# Patient Record
Sex: FEMALE | Race: WHITE | NOT HISPANIC OR LATINO | Employment: OTHER | URBAN - METROPOLITAN AREA
[De-identification: names, ages, dates, MRNs, and addresses within clinical notes are randomized per-mention and may not be internally consistent; named-entity substitution may affect disease eponyms.]

---

## 2017-03-02 ENCOUNTER — HISTORICAL (OUTPATIENT)
Dept: RADIOLOGY | Facility: HOSPITAL | Age: 62
End: 2017-03-02

## 2017-09-27 ENCOUNTER — HISTORICAL (OUTPATIENT)
Dept: ADMINISTRATIVE | Facility: HOSPITAL | Age: 62
End: 2017-09-27

## 2018-01-26 ENCOUNTER — HISTORICAL (OUTPATIENT)
Dept: ADMINISTRATIVE | Facility: HOSPITAL | Age: 63
End: 2018-01-26

## 2018-10-10 ENCOUNTER — HISTORICAL (OUTPATIENT)
Dept: ADMINISTRATIVE | Facility: HOSPITAL | Age: 63
End: 2018-10-10

## 2018-12-06 LAB
INFLUENZA A ANTIGEN, POC: NEGATIVE
INFLUENZA B ANTIGEN, POC: NEGATIVE

## 2018-12-14 LAB
INFLUENZA A ANTIGEN, POC: NEGATIVE
INFLUENZA B ANTIGEN, POC: NEGATIVE

## 2019-07-24 ENCOUNTER — HISTORICAL (OUTPATIENT)
Dept: LAB | Facility: HOSPITAL | Age: 64
End: 2019-07-24

## 2019-08-02 ENCOUNTER — HISTORICAL (OUTPATIENT)
Dept: ADMINISTRATIVE | Facility: HOSPITAL | Age: 64
End: 2019-08-02

## 2020-06-17 ENCOUNTER — HISTORICAL (OUTPATIENT)
Dept: ADMINISTRATIVE | Facility: HOSPITAL | Age: 65
End: 2020-06-17

## 2020-11-25 ENCOUNTER — HISTORICAL (OUTPATIENT)
Dept: ADMINISTRATIVE | Facility: HOSPITAL | Age: 65
End: 2020-11-25

## 2021-02-10 LAB
CHOLEST SERPL-MSCNC: 125 MG/DL (ref 0–200)
HDLC SERPL-MCNC: 46 MG/DL (ref 35–70)
LDLC SERPL CALC-MCNC: 50 MG/DL (ref 0–160)
TRIGL SERPL-MCNC: 144 MG/DL (ref 40–160)

## 2021-10-26 ENCOUNTER — HISTORICAL (OUTPATIENT)
Dept: ADMINISTRATIVE | Facility: HOSPITAL | Age: 66
End: 2021-10-26

## 2021-10-29 ENCOUNTER — HISTORICAL (OUTPATIENT)
Dept: ADMINISTRATIVE | Facility: HOSPITAL | Age: 66
End: 2021-10-29

## 2021-11-24 ENCOUNTER — HISTORICAL (OUTPATIENT)
Dept: ADMINISTRATIVE | Facility: HOSPITAL | Age: 66
End: 2021-11-24

## 2021-11-30 ENCOUNTER — HISTORICAL (OUTPATIENT)
Dept: ADMINISTRATIVE | Facility: HOSPITAL | Age: 66
End: 2021-11-30

## 2022-04-07 ENCOUNTER — HISTORICAL (OUTPATIENT)
Dept: ADMINISTRATIVE | Facility: HOSPITAL | Age: 67
End: 2022-04-07
Payer: COMMERCIAL

## 2022-04-24 VITALS
OXYGEN SATURATION: 99 % | SYSTOLIC BLOOD PRESSURE: 132 MMHG | WEIGHT: 250 LBS | DIASTOLIC BLOOD PRESSURE: 84 MMHG | BODY MASS INDEX: 40.18 KG/M2 | HEIGHT: 66 IN

## 2022-04-28 NOTE — OP NOTE
Patient:   Lola Arana            MRN: [id number]            FIN: [id number]               Age:   66 years     Sex:  Female     :  1955   Associated Diagnoses:   None   Author:   Tom Barone MD      Operative Note   DATE OF OPERATION:  2021    PREOPERATIVE DIAGNOSIS:  1. Left carpal tunnel syndrome    POSTOPERATIVE DIAGNOSIS:  1. Left carpal tunnel syndrome    SURGEON:  Tom Barone M.D.   ASSISTANT: HILARY Lopez    PROCEDURE:  1. Left carpal tunnel release    ANESTHESIA:  Local with monitored anesthesia care    BLOOD LOSS:  minimal    SPECIMEN(s):  none    COMPLICATIONS:  None    HISTORY:  The patient is a 66-year-old woman with a remote history of right carpal tunnel release who has had progressively worsening left-sided symptoms. Electrodiagnostic testing and physical examination are most consistent with left carpal tunnel syndrome. Options were discussed and, after conservative management failed, surgery was elected. The patient understood and accepted the nature of the surgery as well as its attendant risks.    FINDINGS:  As expected there was thickening of the transverse carpal ligament. There was reactive hyperemia of the nerve after decompression. The nerve was well decompressed proximally and distally at the completion of the procedure    PROCEDURE IN DETAIL:  Under monitored anesthesia care patient's [ ] arm was prepped and draped in the usual fashion. Small linear incision was marked out just to the ulnar side of the palmar crease and infiltrated with local anesthetic containing epinephrine. Incision was carried down through the skin and subcutaneous tissues with a knife and then self-retaining retractor was put into place. He is tissue and then the transverse carpal ligament was divided sharply and then scissors were used to complete the decompression proximally and distally. The wound was irrigated copiously with antibiotic irrigation. Bleeding points were  controlled bipolar cautery. The wound was closed with 3-0 nylon in an interrupted vertical mattress fashion. The wound was dressed locally. The patient was taken to the postanesthetic care unit in satisfactory condition with correct sponge and needle counts.

## 2022-04-28 NOTE — H&P
Patient:   Lola Arana            MRN: [id number]            FIN: [id number]               Age:   66 years     Sex:  Female     :  1955   Associated Diagnoses:   None   Author:   Homer BRAY, Mary Candelaria      Health Status   The H&P was reviewed, the patient was examined, and there are no changes to the patient's condition..

## 2022-05-02 DIAGNOSIS — F41.1 GAD (GENERALIZED ANXIETY DISORDER): Primary | ICD-10-CM

## 2022-05-02 RX ORDER — VENLAFAXINE HYDROCHLORIDE 75 MG/1
75 CAPSULE, EXTENDED RELEASE ORAL DAILY
Qty: 90 CAPSULE | Refills: 3 | Status: CANCELLED | OUTPATIENT
Start: 2022-05-02

## 2022-05-02 RX ORDER — VENLAFAXINE HYDROCHLORIDE 75 MG/1
1 CAPSULE, EXTENDED RELEASE ORAL DAILY
COMMUNITY
Start: 2022-04-25 | End: 2022-06-20 | Stop reason: SDUPTHER

## 2022-05-16 DIAGNOSIS — G56.02 CARPAL TUNNEL SYNDROME ON LEFT: Primary | ICD-10-CM

## 2022-06-01 ENCOUNTER — HOSPITAL ENCOUNTER (OUTPATIENT)
Dept: RADIOLOGY | Facility: CLINIC | Age: 67
Discharge: HOME | End: 2022-06-01
Attending: NURSE PRACTITIONER
Payer: COMMERCIAL

## 2022-06-01 ENCOUNTER — OFFICE VISIT (OUTPATIENT)
Dept: ORTHOPEDICS | Facility: CLINIC | Age: 67
End: 2022-06-01
Payer: COMMERCIAL

## 2022-06-01 VITALS
HEIGHT: 66 IN | BODY MASS INDEX: 40.02 KG/M2 | WEIGHT: 249 LBS | DIASTOLIC BLOOD PRESSURE: 84 MMHG | SYSTOLIC BLOOD PRESSURE: 132 MMHG

## 2022-06-01 DIAGNOSIS — M18.12 ARTHRITIS OF CARPOMETACARPAL (CMC) JOINT OF LEFT THUMB: ICD-10-CM

## 2022-06-01 DIAGNOSIS — G56.02 CARPAL TUNNEL SYNDROME ON LEFT: ICD-10-CM

## 2022-06-01 PROCEDURE — 3008F BODY MASS INDEX DOCD: CPT | Mod: CPTII,,, | Performed by: NURSE PRACTITIONER

## 2022-06-01 PROCEDURE — 73130 XR HAND COMPLETE 3 VIEW LEFT: ICD-10-PCS | Mod: LT,,, | Performed by: NURSE PRACTITIONER

## 2022-06-01 PROCEDURE — 1159F PR MEDICATION LIST DOCUMENTED IN MEDICAL RECORD: ICD-10-PCS | Mod: CPTII,,, | Performed by: NURSE PRACTITIONER

## 2022-06-01 PROCEDURE — 1125F AMNT PAIN NOTED PAIN PRSNT: CPT | Mod: CPTII,,, | Performed by: NURSE PRACTITIONER

## 2022-06-01 PROCEDURE — 99203 PR OFFICE/OUTPT VISIT, NEW, LEVL III, 30-44 MIN: ICD-10-PCS | Mod: ,,, | Performed by: NURSE PRACTITIONER

## 2022-06-01 PROCEDURE — 1125F PR PAIN SEVERITY QUANTIFIED, PAIN PRESENT: ICD-10-PCS | Mod: CPTII,,, | Performed by: NURSE PRACTITIONER

## 2022-06-01 PROCEDURE — 4010F ACE/ARB THERAPY RXD/TAKEN: CPT | Mod: CPTII,,, | Performed by: NURSE PRACTITIONER

## 2022-06-01 PROCEDURE — 3075F PR MOST RECENT SYSTOLIC BLOOD PRESS GE 130-139MM HG: ICD-10-PCS | Mod: CPTII,,, | Performed by: NURSE PRACTITIONER

## 2022-06-01 PROCEDURE — 99203 OFFICE O/P NEW LOW 30 MIN: CPT | Mod: ,,, | Performed by: NURSE PRACTITIONER

## 2022-06-01 PROCEDURE — 4010F PR ACE/ARB THEARPY RXD/TAKEN: ICD-10-PCS | Mod: CPTII,,, | Performed by: NURSE PRACTITIONER

## 2022-06-01 PROCEDURE — 3079F PR MOST RECENT DIASTOLIC BLOOD PRESSURE 80-89 MM HG: ICD-10-PCS | Mod: CPTII,,, | Performed by: NURSE PRACTITIONER

## 2022-06-01 PROCEDURE — 1159F MED LIST DOCD IN RCRD: CPT | Mod: CPTII,,, | Performed by: NURSE PRACTITIONER

## 2022-06-01 PROCEDURE — 3079F DIAST BP 80-89 MM HG: CPT | Mod: CPTII,,, | Performed by: NURSE PRACTITIONER

## 2022-06-01 PROCEDURE — 73130 X-RAY EXAM OF HAND: CPT | Mod: LT,,, | Performed by: NURSE PRACTITIONER

## 2022-06-01 PROCEDURE — 3008F PR BODY MASS INDEX (BMI) DOCUMENTED: ICD-10-PCS | Mod: CPTII,,, | Performed by: NURSE PRACTITIONER

## 2022-06-01 PROCEDURE — 3075F SYST BP GE 130 - 139MM HG: CPT | Mod: CPTII,,, | Performed by: NURSE PRACTITIONER

## 2022-06-01 RX ORDER — DULAGLUTIDE 0.75 MG/.5ML
INJECTION, SOLUTION SUBCUTANEOUS
COMMUNITY
Start: 2022-05-14 | End: 2023-03-16

## 2022-06-01 RX ORDER — LISINOPRIL 20 MG/1
20 TABLET ORAL DAILY
COMMUNITY
Start: 2022-03-10 | End: 2023-03-16 | Stop reason: SDUPTHER

## 2022-06-01 RX ORDER — ATORVASTATIN CALCIUM 20 MG/1
20 TABLET, FILM COATED ORAL DAILY
COMMUNITY
Start: 2022-03-10 | End: 2022-07-27 | Stop reason: SDUPTHER

## 2022-06-01 RX ORDER — METFORMIN HYDROCHLORIDE 1000 MG/1
1000 TABLET ORAL DAILY
COMMUNITY
Start: 2022-05-24 | End: 2023-02-27 | Stop reason: SDUPTHER

## 2022-06-01 RX ORDER — METOPROLOL SUCCINATE 100 MG/1
100 TABLET, EXTENDED RELEASE ORAL DAILY
COMMUNITY
Start: 2022-03-09 | End: 2023-03-02 | Stop reason: SDUPTHER

## 2022-06-01 RX ORDER — MONTELUKAST SODIUM 10 MG/1
10 TABLET ORAL DAILY
COMMUNITY
Start: 2022-04-25 | End: 2022-07-29 | Stop reason: SDUPTHER

## 2022-06-01 NOTE — PROGRESS NOTES
Chief Complaint:   Chief Complaint   Patient presents with    Left Hand - Pain    Pain     Left carpal tunnel Dr. Barone did sx on it 21, states the pain didnt go away       Consulting Physician: Demi Muñoz PA-C    History of present illness:  She is a pleasant 66-year-old who presents with a longstanding history of left thumb pain.  She had a history of carpal tunnel syndrome, CTR in 2021. Most of her symptoms have resolved with the exception of his thumb pain.  It is worse with any activity, and is affecting her ADLs and her ability to write.  He is somewhat better with rest and a brace.  She has tried occupational therapy without much relief.  Referred by Dr. Barone's office for further evaluation.    Past Medical History:   Diagnosis Date    Arthritis     Diabetes mellitus, type 2     Fractures     Hypertension     Respiratory distress        Past Surgical History:   Procedure Laterality Date    CARPAL TUNNEL RELEASE       SECTION      FOOT SURGERY      GALLBLADDER SURGERY      HERNIA REPAIR      KNEE ARTHROSCOPY      KNEE SURGERY         Current Outpatient Medications   Medication Sig    atorvastatin (LIPITOR) 20 MG tablet Take 20 mg by mouth once daily.    lisinopriL (PRINIVIL,ZESTRIL) 20 MG tablet Take 20 mg by mouth once daily.    metFORMIN (GLUCOPHAGE) 1000 MG tablet Take 1,000 mg by mouth once daily.    metoprolol succinate (TOPROL-XL) 100 MG 24 hr tablet Take 100 mg by mouth once daily.    montelukast (SINGULAIR) 10 mg tablet Take 10 mg by mouth once daily.    TRULICITY 0.75 mg/0.5 mL pen injector     venlafaxine (EFFEXOR-XR) 75 MG 24 hr capsule Take 1 capsule by mouth once daily.     No current facility-administered medications for this visit.       Review of patient's allergies indicates:  No Known Allergies    History reviewed. No pertinent family history.    Social History     Socioeconomic History    Marital status:    Tobacco Use     "Smoking status: Never Smoker    Smokeless tobacco: Never Used       Review of Systems:    Constitution:   Denies chills, fever, and sweats.  HENT:   Denies headaches or blurry vision.  Cardiovascular:  Denies chest pain or irregular heart beat.  Respiratory:   Denies cough or shortness of breath.  Gastrointestinal:  Denies abdominal pain, nausea, or vomiting.  Musculoskeletal:   Denies muscle cramps.  Neurological:   Denies dizziness or focal weakness.  Psychiatric/Behavior: Normal mental status.  Hematology/Lymph:  Denies bleeding problem or easy bruising/bleeding.  Skin:    Denies rash or suspicious lesions.    Examination:    Vital Signs:    Vitals:    06/01/22 1335   BP: 132/84   Weight: 112.9 kg (249 lb)   Height: 5' 6" (1.676 m)   PainSc:   7       Body mass index is 40.19 kg/m².    Constitution:   Well-developed, well nourished patient in no acute distress.  Neurological:   Alert and oriented x 3 and cooperative to examination.     Psychiatric/Behavior: Normal mental status.  Respiratory:   No shortness of breath.  Eyes:    Extraoccular muscles intact  Skin:    No scars, rash or suspicious lesions.    MSK:   Left thumb without obvious deformity. ROM full. Distally neurovascular intact. TTP along the krause side of the thumb. Negative grind test.     Imaging: X-rays ordered and images interpreted today personally by me of 4 views of her left hand/thumb show arthritis of the CMC joint        Assessment: Carpal tunnel syndrome on left  -     Ambulatory referral/consult to Orthopedics  -     X-Ray Hand Complete Left; Future; Expected date: 06/01/2022        Plan:  Treatment options were discussed. She will continue the brace and antiinflammatories. If pain persists or worsens we can consider a CMC arthroplasty.   "

## 2022-06-16 ENCOUNTER — PATIENT MESSAGE (OUTPATIENT)
Dept: PRIMARY CARE CLINIC | Facility: CLINIC | Age: 67
End: 2022-06-16
Payer: COMMERCIAL

## 2022-06-18 PROBLEM — M50.20 HERNIATION OF INTERVERTEBRAL DISC OF CERVICAL REGION: Status: ACTIVE | Noted: 2022-06-18

## 2022-06-18 PROBLEM — G56.00 CARPAL TUNNEL SYNDROME: Status: ACTIVE | Noted: 2022-06-18

## 2022-06-18 PROBLEM — M19.90 OSTEOARTHRITIS: Chronic | Status: ACTIVE | Noted: 2022-06-18

## 2022-06-18 PROBLEM — I10 HYPERTENSION: Chronic | Status: ACTIVE | Noted: 2022-06-18

## 2022-06-18 PROBLEM — F41.1 GENERALIZED ANXIETY DISORDER: Chronic | Status: ACTIVE | Noted: 2022-06-18

## 2022-06-18 PROBLEM — R00.2 PALPITATIONS: Status: ACTIVE | Noted: 2022-06-18

## 2022-06-18 PROBLEM — I10 HYPERTENSION: Status: ACTIVE | Noted: 2022-06-18

## 2022-06-18 PROBLEM — M19.90 OSTEOARTHRITIS: Status: ACTIVE | Noted: 2022-06-18

## 2022-06-18 PROBLEM — R42 VERTIGO: Chronic | Status: ACTIVE | Noted: 2022-06-18

## 2022-06-18 PROBLEM — F41.1 GENERALIZED ANXIETY DISORDER: Status: ACTIVE | Noted: 2022-06-18

## 2022-06-18 PROBLEM — E11.9 DIABETES MELLITUS: Chronic | Status: ACTIVE | Noted: 2022-06-18

## 2022-06-18 PROBLEM — M50.20 HERNIATION OF INTERVERTEBRAL DISC OF CERVICAL REGION: Chronic | Status: ACTIVE | Noted: 2022-06-18

## 2022-06-18 PROBLEM — E78.5 HYPERLIPIDEMIA: Status: ACTIVE | Noted: 2022-06-18

## 2022-06-18 PROBLEM — E78.5 HYPERLIPIDEMIA: Chronic | Status: ACTIVE | Noted: 2022-06-18

## 2022-06-18 PROBLEM — E11.9 DIABETES MELLITUS: Status: ACTIVE | Noted: 2022-06-18

## 2022-06-18 PROBLEM — G56.00 CARPAL TUNNEL SYNDROME: Chronic | Status: ACTIVE | Noted: 2022-06-18

## 2022-06-18 PROBLEM — R00.2 PALPITATIONS: Chronic | Status: ACTIVE | Noted: 2022-06-18

## 2022-06-18 PROBLEM — R42 VERTIGO: Status: ACTIVE | Noted: 2022-06-18

## 2022-06-20 ENCOUNTER — PATIENT MESSAGE (OUTPATIENT)
Dept: PRIMARY CARE CLINIC | Facility: CLINIC | Age: 67
End: 2022-06-20
Payer: COMMERCIAL

## 2022-06-20 RX ORDER — VENLAFAXINE HYDROCHLORIDE 75 MG/1
75 CAPSULE, EXTENDED RELEASE ORAL DAILY
Qty: 30 CAPSULE | Refills: 11 | Status: SHIPPED | OUTPATIENT
Start: 2022-06-20 | End: 2022-06-23 | Stop reason: ALTCHOICE

## 2022-06-23 RX ORDER — VENLAFAXINE HYDROCHLORIDE 150 MG/1
150 CAPSULE, EXTENDED RELEASE ORAL DAILY
Qty: 90 CAPSULE | Refills: 3 | Status: SHIPPED | OUTPATIENT
Start: 2022-06-23 | End: 2022-06-25 | Stop reason: SDUPTHER

## 2022-06-23 RX ORDER — VENLAFAXINE HYDROCHLORIDE 150 MG/1
150 CAPSULE, EXTENDED RELEASE ORAL DAILY
COMMUNITY
End: 2022-06-23 | Stop reason: SDUPTHER

## 2022-07-06 ENCOUNTER — PATIENT OUTREACH (OUTPATIENT)
Dept: ADMINISTRATIVE | Facility: HOSPITAL | Age: 67
End: 2022-07-06
Payer: COMMERCIAL

## 2022-07-06 NOTE — PROGRESS NOTES
Population Health. Out Reach.  The following record(s)  below were uploaded for Health Maintenance .    2/10/21 LIPID PANEL

## 2022-07-20 ENCOUNTER — LAB REQUISITION (OUTPATIENT)
Dept: LAB | Facility: HOSPITAL | Age: 67
End: 2022-07-20
Payer: COMMERCIAL

## 2022-07-20 DIAGNOSIS — Z13.6 ENCOUNTER FOR SCREENING FOR CARDIOVASCULAR DISORDERS: ICD-10-CM

## 2022-07-20 DIAGNOSIS — E11.9 TYPE 2 DIABETES MELLITUS WITHOUT COMPLICATIONS: ICD-10-CM

## 2022-07-20 DIAGNOSIS — E78.5 HYPERLIPIDEMIA, UNSPECIFIED: ICD-10-CM

## 2022-07-20 DIAGNOSIS — Z00.00 ENCOUNTER FOR GENERAL ADULT MEDICAL EXAMINATION WITHOUT ABNORMAL FINDINGS: ICD-10-CM

## 2022-07-20 DIAGNOSIS — I10 ESSENTIAL (PRIMARY) HYPERTENSION: ICD-10-CM

## 2022-07-20 LAB
ALBUMIN SERPL-MCNC: 4 GM/DL (ref 3.4–4.8)
ALBUMIN/GLOB SERPL: 1.6 RATIO (ref 1.1–2)
ALP SERPL-CCNC: 83 UNIT/L (ref 40–150)
ALT SERPL-CCNC: 42 UNIT/L (ref 0–55)
AST SERPL-CCNC: 26 UNIT/L (ref 5–34)
BASOPHILS # BLD AUTO: 0.04 X10(3)/MCL (ref 0–0.2)
BASOPHILS NFR BLD AUTO: 0.6 %
BILIRUBIN DIRECT+TOT PNL SERPL-MCNC: 0.6 MG/DL
BUN SERPL-MCNC: 14.5 MG/DL (ref 9.8–20.1)
CALCIUM SERPL-MCNC: 9.2 MG/DL (ref 8.4–10.2)
CHLORIDE SERPL-SCNC: 109 MMOL/L (ref 98–107)
CHOLEST SERPL-MCNC: 208 MG/DL
CHOLEST/HDLC SERPL: 4 {RATIO} (ref 0–5)
CO2 SERPL-SCNC: 22 MMOL/L (ref 23–31)
CREAT SERPL-MCNC: 0.81 MG/DL (ref 0.55–1.02)
CREAT UR-MCNC: 62.7 MG/DL (ref 47–110)
EOSINOPHIL # BLD AUTO: 0.14 X10(3)/MCL (ref 0–0.9)
EOSINOPHIL NFR BLD AUTO: 2.2 %
ERYTHROCYTE [DISTWIDTH] IN BLOOD BY AUTOMATED COUNT: 15.1 % (ref 11.5–17)
EST. AVERAGE GLUCOSE BLD GHB EST-MCNC: 194.4 MG/DL
GLOBULIN SER-MCNC: 2.5 GM/DL (ref 2.4–3.5)
GLUCOSE SERPL-MCNC: 250 MG/DL (ref 82–115)
HBA1C MFR BLD: 8.4 %
HCT VFR BLD AUTO: 41.8 % (ref 37–47)
HDLC SERPL-MCNC: 57 MG/DL (ref 35–60)
HGB BLD-MCNC: 13.7 GM/DL (ref 12–16)
IMM GRANULOCYTES # BLD AUTO: 0.02 X10(3)/MCL (ref 0–0.04)
IMM GRANULOCYTES NFR BLD AUTO: 0.3 %
LDLC SERPL CALC-MCNC: 120 MG/DL (ref 50–140)
LYMPHOCYTES # BLD AUTO: 1.83 X10(3)/MCL (ref 0.6–4.6)
LYMPHOCYTES NFR BLD AUTO: 28.7 %
MCH RBC QN AUTO: 30.1 PG (ref 27–31)
MCHC RBC AUTO-ENTMCNC: 32.8 MG/DL (ref 33–36)
MCV RBC AUTO: 91.9 FL (ref 80–94)
MICROALBUMIN UR-MCNC: <5 UG/ML
MICROALBUMIN/CREAT RATIO PNL UR: <8 MG/GM CR (ref 0–30)
MONOCYTES # BLD AUTO: 0.51 X10(3)/MCL (ref 0.1–1.3)
MONOCYTES NFR BLD AUTO: 8 %
NEUTROPHILS # BLD AUTO: 3.8 X10(3)/MCL (ref 2.1–9.2)
NEUTROPHILS NFR BLD AUTO: 60.2 %
NRBC BLD AUTO-RTO: 0 %
PLATELET # BLD AUTO: 219 X10(3)/MCL (ref 130–400)
PMV BLD AUTO: 11.2 FL (ref 7.4–10.4)
POTASSIUM SERPL-SCNC: 4.3 MMOL/L (ref 3.5–5.1)
PROT SERPL-MCNC: 6.5 GM/DL (ref 5.8–7.6)
RBC # BLD AUTO: 4.55 X10(6)/MCL (ref 4.2–5.4)
SODIUM SERPL-SCNC: 142 MMOL/L (ref 136–145)
TRIGL SERPL-MCNC: 156 MG/DL (ref 37–140)
TSH SERPL-ACNC: 0.91 UIU/ML (ref 0.35–4.94)
VLDLC SERPL CALC-MCNC: 31 MG/DL
WBC # SPEC AUTO: 6.4 X10(3)/MCL (ref 4.5–11.5)

## 2022-07-20 PROCEDURE — 83036 HEMOGLOBIN GLYCOSYLATED A1C: CPT | Performed by: GENERAL PRACTICE

## 2022-07-20 PROCEDURE — 84443 ASSAY THYROID STIM HORMONE: CPT | Performed by: GENERAL PRACTICE

## 2022-07-20 PROCEDURE — 85025 COMPLETE CBC W/AUTO DIFF WBC: CPT | Performed by: GENERAL PRACTICE

## 2022-07-20 PROCEDURE — 80053 COMPREHEN METABOLIC PANEL: CPT | Performed by: GENERAL PRACTICE

## 2022-07-20 PROCEDURE — 82043 UR ALBUMIN QUANTITATIVE: CPT | Performed by: GENERAL PRACTICE

## 2022-07-20 PROCEDURE — 80061 LIPID PANEL: CPT | Performed by: GENERAL PRACTICE

## 2022-07-27 ENCOUNTER — TELEPHONE (OUTPATIENT)
Dept: PRIMARY CARE CLINIC | Facility: CLINIC | Age: 67
End: 2022-07-27
Payer: COMMERCIAL

## 2022-07-27 DIAGNOSIS — E78.5 HYPERLIPIDEMIA, UNSPECIFIED HYPERLIPIDEMIA TYPE: Primary | ICD-10-CM

## 2022-07-27 RX ORDER — ATORVASTATIN CALCIUM 20 MG/1
20 TABLET, FILM COATED ORAL DAILY
Qty: 90 TABLET | Refills: 3 | Status: SHIPPED | OUTPATIENT
Start: 2022-07-27 | End: 2023-06-22 | Stop reason: SDUPTHER

## 2022-07-27 NOTE — TELEPHONE ENCOUNTER
----- Message from Eder Combs MD sent at 7/24/2022 12:50 PM CDT -----  Patient does have an upcoming appointment in about a, but her A1c is a little too high at 8.4%.  I think that her Trulicity dose is 0.75mg per week, if that is the case I would like for her to increase to 1.5 mg per week, if she is agreeable to that and everything is correct .

## 2022-07-28 ENCOUNTER — TELEPHONE (OUTPATIENT)
Dept: PRIMARY CARE CLINIC | Facility: CLINIC | Age: 67
End: 2022-07-28
Payer: COMMERCIAL

## 2022-07-28 DIAGNOSIS — F41.1 GENERALIZED ANXIETY DISORDER: Primary | Chronic | ICD-10-CM

## 2022-07-28 RX ORDER — LORAZEPAM 0.5 MG/1
0.5 TABLET ORAL EVERY 6 HOURS PRN
Qty: 30 TABLET | Refills: 2 | Status: SHIPPED | OUTPATIENT
Start: 2022-07-28 | End: 2023-05-25 | Stop reason: SDUPTHER

## 2022-07-28 NOTE — TELEPHONE ENCOUNTER
Spoke to patient yesterday regarding her A1C and Trulicity medication. She stated that she hasn't been taking the Trulicity since June. She also stated she has not been watching what she has been eating. Please Advise what she needs to do now regarding medication

## 2022-07-29 DIAGNOSIS — J30.2 SEASONAL ALLERGIES: Primary | ICD-10-CM

## 2022-07-29 RX ORDER — MONTELUKAST SODIUM 10 MG/1
10 TABLET ORAL DAILY
Qty: 90 TABLET | Refills: 3 | Status: SHIPPED | OUTPATIENT
Start: 2022-07-29 | End: 2024-04-01 | Stop reason: SDUPTHER

## 2022-08-18 ENCOUNTER — OFFICE VISIT (OUTPATIENT)
Dept: URGENT CARE | Facility: CLINIC | Age: 67
End: 2022-08-18
Payer: COMMERCIAL

## 2022-08-18 VITALS
BODY MASS INDEX: 40.02 KG/M2 | WEIGHT: 249 LBS | HEIGHT: 66 IN | TEMPERATURE: 99 F | OXYGEN SATURATION: 97 % | DIASTOLIC BLOOD PRESSURE: 84 MMHG | SYSTOLIC BLOOD PRESSURE: 139 MMHG | HEART RATE: 84 BPM

## 2022-08-18 DIAGNOSIS — S91.301A OPEN WOUND OF RIGHT FOOT, INITIAL ENCOUNTER: ICD-10-CM

## 2022-08-18 DIAGNOSIS — S91.302A OPEN WOUND OF LEFT FOOT, INITIAL ENCOUNTER: Primary | ICD-10-CM

## 2022-08-18 PROCEDURE — 3061F NEG MICROALBUMINURIA REV: CPT | Mod: CPTII,,, | Performed by: FAMILY MEDICINE

## 2022-08-18 PROCEDURE — 4010F ACE/ARB THERAPY RXD/TAKEN: CPT | Mod: CPTII,,, | Performed by: FAMILY MEDICINE

## 2022-08-18 PROCEDURE — 3075F SYST BP GE 130 - 139MM HG: CPT | Mod: CPTII,,, | Performed by: FAMILY MEDICINE

## 2022-08-18 PROCEDURE — 1159F PR MEDICATION LIST DOCUMENTED IN MEDICAL RECORD: ICD-10-PCS | Mod: CPTII,,, | Performed by: FAMILY MEDICINE

## 2022-08-18 PROCEDURE — 99213 PR OFFICE/OUTPT VISIT, EST, LEVL III, 20-29 MIN: ICD-10-PCS | Mod: ,,, | Performed by: FAMILY MEDICINE

## 2022-08-18 PROCEDURE — 3008F PR BODY MASS INDEX (BMI) DOCUMENTED: ICD-10-PCS | Mod: CPTII,,, | Performed by: FAMILY MEDICINE

## 2022-08-18 PROCEDURE — 1159F MED LIST DOCD IN RCRD: CPT | Mod: CPTII,,, | Performed by: FAMILY MEDICINE

## 2022-08-18 PROCEDURE — 3061F PR NEG MICROALBUMINURIA RESULT DOCUMENTED/REVIEW: ICD-10-PCS | Mod: CPTII,,, | Performed by: FAMILY MEDICINE

## 2022-08-18 PROCEDURE — 3079F PR MOST RECENT DIASTOLIC BLOOD PRESSURE 80-89 MM HG: ICD-10-PCS | Mod: CPTII,,, | Performed by: FAMILY MEDICINE

## 2022-08-18 PROCEDURE — 3066F PR DOCUMENTATION OF TREATMENT FOR NEPHROPATHY: ICD-10-PCS | Mod: CPTII,,, | Performed by: FAMILY MEDICINE

## 2022-08-18 PROCEDURE — 1160F RVW MEDS BY RX/DR IN RCRD: CPT | Mod: CPTII,,, | Performed by: FAMILY MEDICINE

## 2022-08-18 PROCEDURE — 3066F NEPHROPATHY DOC TX: CPT | Mod: CPTII,,, | Performed by: FAMILY MEDICINE

## 2022-08-18 PROCEDURE — 99213 OFFICE O/P EST LOW 20 MIN: CPT | Mod: ,,, | Performed by: FAMILY MEDICINE

## 2022-08-18 PROCEDURE — 1160F PR REVIEW ALL MEDS BY PRESCRIBER/CLIN PHARMACIST DOCUMENTED: ICD-10-PCS | Mod: CPTII,,, | Performed by: FAMILY MEDICINE

## 2022-08-18 PROCEDURE — 4010F PR ACE/ARB THEARPY RXD/TAKEN: ICD-10-PCS | Mod: CPTII,,, | Performed by: FAMILY MEDICINE

## 2022-08-18 PROCEDURE — 3079F DIAST BP 80-89 MM HG: CPT | Mod: CPTII,,, | Performed by: FAMILY MEDICINE

## 2022-08-18 PROCEDURE — 3008F BODY MASS INDEX DOCD: CPT | Mod: CPTII,,, | Performed by: FAMILY MEDICINE

## 2022-08-18 PROCEDURE — 3075F PR MOST RECENT SYSTOLIC BLOOD PRESS GE 130-139MM HG: ICD-10-PCS | Mod: CPTII,,, | Performed by: FAMILY MEDICINE

## 2022-08-18 RX ORDER — CLINDAMYCIN HYDROCHLORIDE 300 MG/1
300 CAPSULE ORAL EVERY 6 HOURS
Qty: 28 CAPSULE | Refills: 0 | Status: SHIPPED | OUTPATIENT
Start: 2022-08-18 | End: 2022-08-25

## 2022-08-18 NOTE — PROGRESS NOTES
"Subjective:       Patient ID: Lola Arana is a 67 y.o. female.    Vitals:  height is 5' 6" (1.676 m) and weight is 112.9 kg (249 lb). Her temperature is 98.8 °F (37.1 °C). Her blood pressure is 139/84 and her pulse is 84. Her oxygen saturation is 97%.     Chief Complaint: Toe Injury (67 y.o. female presents to clinic for possible infection from wound underneath  great toe on left foot since 6 days. )    67 y.o. female presents to clinic for possible infection from wound underneath  great toe on left foot since 6 days.  States there is an open wound on the left great toe and on the 2nd toe of the right foot.  States this just occurred she was wearing for care that did not provide proper protection.  Patient states last time she had this she developed bad MRSA infection.  Patient is diabetic.  Denies any fever.    Toe Injury  This is a new problem. The current episode started in the past 7 days.       Constitution: Negative.   HENT: Negative.    Cardiovascular: Negative.    Eyes: Negative.    Respiratory: Negative.    Gastrointestinal: Negative.    Genitourinary: Negative.    Musculoskeletal: Negative.    Skin: Negative.  Positive for erythema (There are 2 open wounds.  Left great toe approximately the size of a nickel and the 2nd toe of the right foot around the size of a dime.  There is no active drainage.  There is no active bleeding.  no significant erythema).   Allergic/Immunologic: Negative.    Neurological: Negative.    Hematologic/Lymphatic: Negative.        Objective:      Physical Exam   HENT:   Head: Normocephalic.   Eyes: Conjunctivae are normal.   Abdominal: Normal appearance.   Neurological: She is alert.   Skin: erythema (There are 2 open wounds.  Left great toe approximately the size of a nickel and the 2nd toe of the right foot around the size of a dime.  There is no active drainage.  There is no active bleeding.  no significant erythema)   Vitals reviewed.         Previous History      Review of " "patient's allergies indicates:  No Known Allergies    Past Medical History:   Diagnosis Date    Arthritis     Diabetes mellitus, type 2     Fractures     Hypertension     Respiratory distress      Current Outpatient Medications   Medication Instructions    atorvastatin (LIPITOR) 20 mg, Oral, Daily    clindamycin (CLEOCIN) 300 mg, Oral, Every 6 hours    lisinopriL (PRINIVIL,ZESTRIL) 20 mg, Oral, Daily    LORazepam (ATIVAN) 0.5 mg, Oral, Every 6 hours PRN    metFORMIN (GLUCOPHAGE) 1,000 mg, Oral, Daily    metoprolol succinate (TOPROL-XL) 100 mg, Oral, Daily    montelukast (SINGULAIR) 10 mg, Oral, Daily    TRULICITY 0.75 mg/0.5 mL pen injector No dose, route, or frequency recorded.    venlafaxine (EFFEXOR-XR) 150 mg, Oral, Daily     Past Surgical History:   Procedure Laterality Date    CARPAL TUNNEL RELEASE       SECTION      FOOT SURGERY      GALLBLADDER SURGERY      HERNIA REPAIR      KNEE ARTHROSCOPY      KNEE SURGERY       History reviewed. No pertinent family history.    Social History     Tobacco Use    Smoking status: Never Smoker    Smokeless tobacco: Never Used        Physical Exam      Vital Signs Reviewed   /84   Pulse 84   Temp 98.8 °F (37.1 °C)   Ht 5' 6" (1.676 m)   Wt 112.9 kg (249 lb)   SpO2 97%   BMI 40.19 kg/m²        Procedures    Procedures     Labs     Results for orders placed or performed in visit on 22   Hemoglobin A1C   Result Value Ref Range    Hemoglobin A1c 8.4 (H) <=7.0 %    Estimated Average Glucose 194.4 mg/dL   Comprehensive Metabolic Panel   Result Value Ref Range    Sodium Level 142 136 - 145 mmol/L    Potassium Level 4.3 3.5 - 5.1 mmol/L    Chloride 109 (H) 98 - 107 mmol/L    Carbon Dioxide 22 (L) 23 - 31 mmol/L    Glucose Level 250 (H) 82 - 115 mg/dL    Blood Urea Nitrogen 14.5 9.8 - 20.1 mg/dL    Creatinine 0.81 0.55 - 1.02 mg/dL    Calcium Level Total 9.2 8.4 - 10.2 mg/dL    Protein Total 6.5 5.8 - 7.6 gm/dL    Albumin Level 4.0 3.4 " - 4.8 gm/dL    Globulin 2.5 2.4 - 3.5 gm/dL    Albumin/Globulin Ratio 1.6 1.1 - 2.0 ratio    Bilirubin Total 0.6 <=1.5 mg/dL    Alkaline Phosphatase 83 40 - 150 unit/L    Alanine Aminotransferase 42 0 - 55 unit/L    Aspartate Aminotransferase 26 5 - 34 unit/L    Estimated GFR-Non  >60 mls/min/1.73/m2   TSH   Result Value Ref Range    Thyroid Stimulating Hormone 0.9110 0.3500 - 4.9400 uIU/mL   Lipid Panel   Result Value Ref Range    Cholesterol Total 208 (H) <=200 mg/dL    HDL Cholesterol 57 35 - 60 mg/dL    Triglyceride 156 (H) 37 - 140 mg/dL    Cholesterol/HDL Ratio 4 0 - 5    Very Low Density Lipoprotein 31     LDL Cholesterol 120.00 50.00 - 140.00 mg/dL   CBC with Differential   Result Value Ref Range    WBC 6.4 4.5 - 11.5 x10(3)/mcL    RBC 4.55 4.20 - 5.40 x10(6)/mcL    Hgb 13.7 12.0 - 16.0 gm/dL    Hct 41.8 37.0 - 47.0 %    MCV 91.9 80.0 - 94.0 fL    MCH 30.1 27.0 - 31.0 pg    MCHC 32.8 (L) 33.0 - 36.0 mg/dL    RDW 15.1 11.5 - 17.0 %    Platelet 219 130 - 400 x10(3)/mcL    MPV 11.2 (H) 7.4 - 10.4 fL    Neut % 60.2 %    Lymph % 28.7 %    Mono % 8.0 %    Eos % 2.2 %    Basophil % 0.6 %    Lymph # 1.83 0.6 - 4.6 x10(3)/mcL    Neut # 3.8 2.1 - 9.2 x10(3)/mcL    Mono # 0.51 0.1 - 1.3 x10(3)/mcL    Eos # 0.14 0 - 0.9 x10(3)/mcL    Baso # 0.04 0 - 0.2 x10(3)/mcL    IG# 0.02 0 - 0.04 x10(3)/mcL    IG% 0.3 %    NRBC% 0.0 %           Assessment:       1. Open wound of left foot, initial encounter    2. Open wound of right foot, initial encounter          Plan:       Antibiotics sent to pharmacy.  You have been referred back to the podiatrist you have seen previously  Keep the dressings clean and dry.  You have extra Xeroform gauze to apply if dressings become soiled.  Monitor for fever.  Monitor for signs of worsening infection.  This would include increasing pain increasing redness pus drainage from the wounds or fever.  Seek medical attention immediately if any of these happen  Open wound of left foot,  initial encounter  -     Ambulatory referral/consult to Podiatry    Open wound of right foot, initial encounter  -     Ambulatory referral/consult to Podiatry    Other orders  -     clindamycin (CLEOCIN) 300 MG capsule; Take 1 capsule (300 mg total) by mouth every 6 (six) hours. for 7 days  Dispense: 28 capsule; Refill: 0

## 2022-08-18 NOTE — PATIENT INSTRUCTIONS
Antibiotics sent to pharmacy.  You have been referred back to the podiatrist you have seen previously  Keep the dressings clean and dry.  You have extra Xeroform gauze to apply if dressings become soiled.  Monitor for fever.  Monitor for signs of worsening infection.  This would include increasing pain increasing redness pus drainage from the wounds or fever.  Seek medical attention immediately if any of these happen

## 2022-08-19 ENCOUNTER — TELEPHONE (OUTPATIENT)
Dept: URGENT CARE | Facility: CLINIC | Age: 67
End: 2022-08-19
Payer: COMMERCIAL

## 2022-08-19 DIAGNOSIS — S91.302A OPEN WOUND OF LEFT FOOT, INITIAL ENCOUNTER: Primary | ICD-10-CM

## 2022-08-19 NOTE — TELEPHONE ENCOUNTER
Referral Sent.           Patient would like a referral to wound care because podiatry will not be able to see her in a timely manner.     Please advise.

## 2022-08-19 NOTE — TELEPHONE ENCOUNTER
Pt was seen in clinic 8/18/22 and was referred to a pediatrist .An appointment would not be available x 3 weeks and pt now requests referral to wound care. Please advise.

## 2022-08-30 ENCOUNTER — OFFICE VISIT (OUTPATIENT)
Dept: PRIMARY CARE CLINIC | Facility: CLINIC | Age: 67
End: 2022-08-30
Payer: COMMERCIAL

## 2022-08-30 VITALS
BODY MASS INDEX: 40.18 KG/M2 | DIASTOLIC BLOOD PRESSURE: 80 MMHG | HEART RATE: 96 BPM | RESPIRATION RATE: 18 BRPM | SYSTOLIC BLOOD PRESSURE: 123 MMHG | HEIGHT: 66 IN | WEIGHT: 250 LBS | OXYGEN SATURATION: 97 %

## 2022-08-30 DIAGNOSIS — I10 PRIMARY HYPERTENSION: Chronic | ICD-10-CM

## 2022-08-30 DIAGNOSIS — F41.1 GENERALIZED ANXIETY DISORDER: Chronic | ICD-10-CM

## 2022-08-30 DIAGNOSIS — E78.5 DYSLIPIDEMIA: Chronic | ICD-10-CM

## 2022-08-30 DIAGNOSIS — E11.9 TYPE 2 DIABETES MELLITUS WITHOUT COMPLICATION, WITHOUT LONG-TERM CURRENT USE OF INSULIN: Chronic | ICD-10-CM

## 2022-08-30 DIAGNOSIS — Z00.00 WELLNESS EXAMINATION: Primary | ICD-10-CM

## 2022-08-30 PROCEDURE — 3061F PR NEG MICROALBUMINURIA RESULT DOCUMENTED/REVIEW: ICD-10-PCS | Mod: CPTII,,, | Performed by: GENERAL PRACTICE

## 2022-08-30 PROCEDURE — 3008F BODY MASS INDEX DOCD: CPT | Mod: CPTII,,, | Performed by: GENERAL PRACTICE

## 2022-08-30 PROCEDURE — G0439 PR MEDICARE ANNUAL WELLNESS SUBSEQUENT VISIT: ICD-10-PCS | Mod: ,,, | Performed by: GENERAL PRACTICE

## 2022-08-30 PROCEDURE — 1159F PR MEDICATION LIST DOCUMENTED IN MEDICAL RECORD: ICD-10-PCS | Mod: CPTII,,, | Performed by: GENERAL PRACTICE

## 2022-08-30 PROCEDURE — 1159F MED LIST DOCD IN RCRD: CPT | Mod: CPTII,,, | Performed by: GENERAL PRACTICE

## 2022-08-30 PROCEDURE — 3066F PR DOCUMENTATION OF TREATMENT FOR NEPHROPATHY: ICD-10-PCS | Mod: CPTII,,, | Performed by: GENERAL PRACTICE

## 2022-08-30 PROCEDURE — 4010F ACE/ARB THERAPY RXD/TAKEN: CPT | Mod: CPTII,,, | Performed by: GENERAL PRACTICE

## 2022-08-30 PROCEDURE — 4010F PR ACE/ARB THEARPY RXD/TAKEN: ICD-10-PCS | Mod: CPTII,,, | Performed by: GENERAL PRACTICE

## 2022-08-30 PROCEDURE — 3061F NEG MICROALBUMINURIA REV: CPT | Mod: CPTII,,, | Performed by: GENERAL PRACTICE

## 2022-08-30 PROCEDURE — 1160F PR REVIEW ALL MEDS BY PRESCRIBER/CLIN PHARMACIST DOCUMENTED: ICD-10-PCS | Mod: CPTII,,, | Performed by: GENERAL PRACTICE

## 2022-08-30 PROCEDURE — 3079F PR MOST RECENT DIASTOLIC BLOOD PRESSURE 80-89 MM HG: ICD-10-PCS | Mod: CPTII,,, | Performed by: GENERAL PRACTICE

## 2022-08-30 PROCEDURE — 3079F DIAST BP 80-89 MM HG: CPT | Mod: CPTII,,, | Performed by: GENERAL PRACTICE

## 2022-08-30 PROCEDURE — 3074F PR MOST RECENT SYSTOLIC BLOOD PRESSURE < 130 MM HG: ICD-10-PCS | Mod: CPTII,,, | Performed by: GENERAL PRACTICE

## 2022-08-30 PROCEDURE — 1160F RVW MEDS BY RX/DR IN RCRD: CPT | Mod: CPTII,,, | Performed by: GENERAL PRACTICE

## 2022-08-30 PROCEDURE — 3066F NEPHROPATHY DOC TX: CPT | Mod: CPTII,,, | Performed by: GENERAL PRACTICE

## 2022-08-30 PROCEDURE — 3008F PR BODY MASS INDEX (BMI) DOCUMENTED: ICD-10-PCS | Mod: CPTII,,, | Performed by: GENERAL PRACTICE

## 2022-08-30 PROCEDURE — 3074F SYST BP LT 130 MM HG: CPT | Mod: CPTII,,, | Performed by: GENERAL PRACTICE

## 2022-08-30 PROCEDURE — G0439 PPPS, SUBSEQ VISIT: HCPCS | Mod: ,,, | Performed by: GENERAL PRACTICE

## 2022-08-30 RX ORDER — LIDOCAINE 50 MG/G
PATCH TOPICAL
COMMUNITY
End: 2023-09-25

## 2022-08-30 RX ORDER — GABAPENTIN 300 MG/1
CAPSULE ORAL
COMMUNITY
Start: 2022-07-30 | End: 2022-11-12 | Stop reason: SDUPTHER

## 2022-08-30 NOTE — PROGRESS NOTES
Patient ID: [id number]     Chief Complaint: Annual Exam (Rt. Groin pain/Left foot open sore on great toe. Went to [facility] was put on Podiatrist)      HPI:     Lola Arana is a 67 y.o. female here today for a Medicare Wellness. No other complaints today.       ----------------------------  Arthritis  Diabetes mellitus, type 2  Fractures  Hypertension  Respiratory distress     Past Surgical History:   Procedure Laterality Date    CARPAL TUNNEL RELEASE       SECTION      CHOLECYSTECTOMY  1999    FOOT SURGERY      GALLBLADDER SURGERY      HERNIA REPAIR      JOINT REPLACEMENT  2009    Right TK    KNEE ARTHROSCOPY      KNEE SURGERY      SPINE SURGERY  2009    ACF Right       Review of patient's allergies indicates:  No Known Allergies    No outpatient medications have been marked as taking for the 22 encounter (Office Visit) with Eder Combs MD.       Social History     Socioeconomic History    Marital status:    Tobacco Use    Smoking status: Never    Smokeless tobacco: Never    Tobacco comments:     Never   Substance and Sexual Activity    Alcohol use: Not Currently    Drug use: Not Currently    Sexual activity: Not Currently     Partners: Male     Birth control/protection: None        Family History   Problem Relation Age of Onset    Arthritis Mother     Cancer Mother         Breast and cervical. 78y/o    Hypertension Mother     COPD Father         Patient Care Team:  Eder Combs MD as PCP - General (Family Medicine)  Rogers Cline DPM as Consulting Physician (Podiatry)  Tara Felipe MD as Obstetrician (Obstetrics)  Elton Vega MD (Dermatology)  Edward Quiroz MD as Consulting Physician (Ophthalmology)       Subjective:     ROS      Patient Reported Health Risk Assessment  What [age] 65-69  Are you male or female?: Female  During the past four weeks, how much have you been bothered by emotional problems such as feeling anxious, depressed, irritable,  sad, or downhearted and blue?: Not at all  During the past five weeks, has your physical and/or emotional health limited your social activities with family, friends, neighbors, or groups?: Not at all  During the past four weeks, how much bodily pain have you generally had?: Mild pain  During the past four weeks, was someone available to help if you needed and wanted help?: Yes, as much as I wanted  During the past four weeks, what was the hardest physical activity you could do for at least two minutes?: Light  Can you get to places out of walking distance without help?  (For example, can you travel alone on buses or taxis, or drive your own car?): Yes  Can you go shopping for groceries or clothes without someone's help?: Yes  Can you prepare your own meals?: Yes  Can you do your own housework without help?: Yes  Because of any health problems, do you need the help of another person with your personal care needs such as eating, bathing, dressing, or getting around the house?: No  Can you handle your own money without help?: Yes  During the past four weeks, how would you rate your health in general?: Very good  How have things been going for you during the past four weeks?: Pretty well  Are you having difficulties driving your car?: No  Do you always fasten your seat belt when you are in a car?: Yes, usually  How often in the past four weeks have you been bothered by falling or dizzy when standing up?: Never  How often in the past four weeks have you been bothered by sexual problems?: Never  How often in the past four weeks have you been bothered by trouble eating well?: Never  How often in the past four weeks have you been bothered by teeth or denture problems?: Never  How often in the past four weeks have you been bothered with problems using the telephone?: Never  How often in the past four weeks have you been bothered by tiredness or fatigue?: Never  Have you fallen two or more times in the past year?: No  Are you  "afraid of falling?: No  Are you a smoker?: No  During the past four weeks, how many drinks of wine, beer, or other alcoholic beverages did you have?: No alcohol at all  Do you exercise for about 20 minutes three or more days a week?: No, I usually do not exercise this much  Have you been given any information to help you with hazards in your house that might hurt you?: No  Have you been given any information to help you with keeping track of your medications?: No  How often do you have trouble taking medicines the way you've been told to take them?: I always take them as prescribed  How confident are you that you can control and manage most of your health problems?: Very confident  What is your race? (Check all that apply.):     Objective:     /80   Pulse 96   Resp 18   Ht 5' 6" (1.676 m)   Wt 113.4 kg (250 lb)   SpO2 97%   BMI 40.35 kg/m²     Physical Exam    Lab Results   Component Value Date     07/20/2022    K 4.3 07/20/2022    CO2 22 (L) 07/20/2022    BUN 14.5 07/20/2022    CREATININE 0.81 07/20/2022    CALCIUM 9.2 07/20/2022    ALBUMIN 4.0 07/20/2022    BILITOT 0.6 07/20/2022    ALKPHOS 83 07/20/2022    AST 26 07/20/2022    ALT 42 07/20/2022    EGFRNONAA >60 07/20/2022     Lab Results   Component Value Date    WBC 6.4 07/20/2022    RBC 4.55 07/20/2022    HGB 13.7 07/20/2022    HCT 41.8 07/20/2022    MCV 91.9 07/20/2022    RDW 15.1 07/20/2022     07/20/2022      Lab Results   Component Value Date    CHOL 208 (H) 07/20/2022    CHOL 125 02/10/2021    TRIG 156 (H) 07/20/2022    TRIG 144 02/10/2021    HDL 57 07/20/2022    HDL 46 02/10/2021    .00 07/20/2022    TOTALCHOLEST 4 07/20/2022     Lab Results   Component Value Date    TSH 0.9110 07/20/2022     Lab Results   Component Value Date    HGBA1C 8.4 (H) 07/20/2022              No flowsheet data found.  No flowsheet data found.        Depression Screening  Over the past two weeks, has the patient felt down, depressed, or " hopeless?: No  Over the past two weeks, has the patient felt little interest or pleasure in doing things?: No  Functional Ability/Safety Screening  Was the patient's timed Up & Go test unsteady or longer than 30 seconds?: No  Does the patient need help with phone, transportation, shopping, preparing meals, housework, laundry, meds, or managing money?: No  Does the patient's home have rugs in the hallway, lack grab bars in the bathroom, lack handrails on the stairs or have poor lighting?: No  Have you noticed any hearing difficulties?: No  Cognitive Function (Assessed through direct observation with due consideration of information obtained by way of patient reports and/or concerns raised by family, friends, caretakers, or others)    Does the patient repeat questions/statements in the same day?: No  Does the patient have trouble remembering the date, year, and time?: No  Does the patient have difficulty managing finances?: No  Does the patient have a decreased sense of direction?: No  Assessment:       ICD-10-CM ICD-9-CM   1. Wellness examination  Z00.00 V70.0   2. Primary hypertension  I10 401.9   3. Dyslipidemia  E78.5 272.4   4. Type 2 diabetes mellitus without complication, without long-term current use of insulin  E11.9 250.00   5. Generalized anxiety disorder  F41.1 300.02        Plan:     Medicare Annual Wellness and Personalized Prevention Plan:   Fall Risk + Home Safety + Hearing Impairment + Depression Screen + Cognitive Impairment Screen + Health Risk Assessment all reviewed.       Health Maintenance Topics with due status: Not Due       Topic Last Completion Date    Influenza Vaccine 10/04/2021    Lipid Panel 07/20/2022      The patient's Health Maintenance was reviewed and the following appears to be due at this time:   Health Maintenance Due   Topic Date Due    Hepatitis C Screening  Never done    TETANUS VACCINE  Never done    DEXA Scan  Never done    Colorectal Cancer Screening  Never done    Shingles  Vaccine (1 of 2) Never done    COVID-19 Vaccine (4 - Booster for Pfizer series) 02/05/2022    Pneumococcal Vaccines (Age 65+) (2 - PCV) 08/11/2022    Mammogram  10/26/2022         Lola was seen today for annual exam.    Diagnoses and all orders for this visit:    Wellness examination  Comments:  Overall health status was reviewed.  Good health habits reinforced.  Annual wellness exams recommended.    Primary hypertension  Comments:  Blood pressures appear to be adequately controlled with current treatment plan, continue medical management and continue home blood pressure checks.     Dyslipidemia  Comments:  Cholesterol/lipid blood testing shows adequate control, continue current treatment plan, including prescription medications if prescribed.    Type 2 diabetes mellitus without complication, without long-term current use of insulin  Comments:  Current A1c 8.4%, I would like to see better control.  Lifestyle changes stress, recheck in three months with an A1c.  Orders:  -     Hemoglobin A1C; Future    Generalized anxiety disorder  Comments:  Medical condition is currently stable, continue current treatment plan.  Discussion concerning change in diabetes medication if A1c does not fall below 8% by next visit.             Medication List with Changes/Refills   Current Medications    ATORVASTATIN (LIPITOR) 20 MG TABLET    Take 1 tablet (20 mg total) by mouth once daily.       Start Date: 7/27/2022 End Date: --    GABAPENTIN (NEURONTIN) 300 MG CAPSULE    Take by mouth.       Start Date: 7/30/2022 End Date: --    LIDOCAINE (LIDODERM) 5 %    1 patch remove after 12 hours       Start Date: --        End Date: --    LISINOPRIL (PRINIVIL,ZESTRIL) 20 MG TABLET    Take 20 mg by mouth once daily.       Start Date: 3/10/2022 End Date: --    LORAZEPAM (ATIVAN) 0.5 MG TABLET    Take 1 tablet (0.5 mg total) by mouth every 6 (six) hours as needed for Anxiety.       Start Date: 7/28/2022 End Date: 8/27/2022    METFORMIN  (GLUCOPHAGE) 1000 MG TABLET    Take 1,000 mg by mouth once daily.       Start Date: 5/24/2022 End Date: --    METOPROLOL SUCCINATE (TOPROL-XL) 100 MG 24 HR TABLET    Take 100 mg by mouth once daily.       Start Date: 3/9/2022  End Date: --    MONTELUKAST (SINGULAIR) 10 MG TABLET    Take 1 tablet (10 mg total) by mouth once daily.       Start Date: 7/29/2022 End Date: --    TRULICITY 0.75 MG/0.5 ML PEN INJECTOR           Start Date: 5/14/2022 End Date: --    VENLAFAXINE (EFFEXOR-XR) 150 MG CP24    Take 1 capsule (150 mg total) by mouth once daily.       Start Date: 6/27/2022 End Date: 6/27/2023        Follow up in about 3 months (around 11/30/2022) for DM F/up. In addition to their scheduled follow up, the patient has also been instructed to follow up on as needed basis.

## 2022-09-15 ENCOUNTER — HISTORICAL (OUTPATIENT)
Dept: ADMINISTRATIVE | Facility: HOSPITAL | Age: 67
End: 2022-09-15
Payer: COMMERCIAL

## 2022-11-03 ENCOUNTER — DOCUMENTATION ONLY (OUTPATIENT)
Dept: PRIMARY CARE CLINIC | Facility: CLINIC | Age: 67
End: 2022-11-03
Payer: MEDICARE

## 2022-11-03 LAB — BCS RECOMMENDATION EXT: NORMAL

## 2022-11-12 RX ORDER — GABAPENTIN 300 MG/1
300 CAPSULE ORAL 3 TIMES DAILY
Qty: 90 CAPSULE | Refills: 11 | Status: SHIPPED | OUTPATIENT
Start: 2022-11-12 | End: 2023-08-11

## 2022-11-15 ENCOUNTER — TELEPHONE (OUTPATIENT)
Dept: PRIMARY CARE CLINIC | Facility: CLINIC | Age: 67
End: 2022-11-15
Payer: MEDICARE

## 2022-11-15 DIAGNOSIS — Z01.818 PREOPERATIVE CLEARANCE: Primary | ICD-10-CM

## 2022-11-15 NOTE — TELEPHONE ENCOUNTER
Spoke to patient regarding clearance. I stated to her that we will draw required lab test during her nurse visit on 11/29 and EKG. I also scheduled her for clearance during her DM f/u

## 2022-11-15 NOTE — TELEPHONE ENCOUNTER
----- Message from Pearl Sorenson sent at 11/15/2022 10:59 AM CST -----  .Type:  Needs Medical Advice    Who Called: pt   Symptoms (please be specific):    How long has patient had these symptoms:    Pharmacy name and phone #:    Would the patient rather a call back or a response via [facility]? Call back  Best Call Back Number: [phone]  Additional Information: pt states opto  Dr Villa office will be sending a medical clearance package over

## 2022-11-29 ENCOUNTER — CLINICAL SUPPORT (OUTPATIENT)
Dept: PRIMARY CARE CLINIC | Facility: CLINIC | Age: 67
End: 2022-11-29
Payer: COMMERCIAL

## 2022-11-29 DIAGNOSIS — Z01.818 PREOPERATIVE CLEARANCE: ICD-10-CM

## 2022-11-29 DIAGNOSIS — E11.9 TYPE 2 DIABETES MELLITUS WITHOUT COMPLICATION, WITHOUT LONG-TERM CURRENT USE OF INSULIN: Chronic | ICD-10-CM

## 2022-11-29 PROBLEM — H25.89 OTHER AGE-RELATED CATARACT: Status: ACTIVE | Noted: 2022-11-29

## 2022-11-29 PROBLEM — H25.89 OTHER AGE-RELATED CATARACT: Chronic | Status: ACTIVE | Noted: 2022-11-29

## 2022-11-29 LAB
ALBUMIN SERPL-MCNC: 4 GM/DL (ref 3.4–4.8)
ALBUMIN/GLOB SERPL: 1.7 RATIO (ref 1.1–2)
ALP SERPL-CCNC: 92 UNIT/L (ref 40–150)
ALT SERPL-CCNC: 25 UNIT/L (ref 0–55)
AST SERPL-CCNC: 16 UNIT/L (ref 5–34)
BASOPHILS # BLD AUTO: 0.04 X10(3)/MCL (ref 0–0.2)
BASOPHILS NFR BLD AUTO: 0.5 %
BILIRUBIN DIRECT+TOT PNL SERPL-MCNC: 0.6 MG/DL
BUN SERPL-MCNC: 18.8 MG/DL (ref 9.8–20.1)
CALCIUM SERPL-MCNC: 9.7 MG/DL (ref 8.4–10.2)
CHLORIDE SERPL-SCNC: 107 MMOL/L (ref 98–107)
CO2 SERPL-SCNC: 23 MMOL/L (ref 23–31)
CREAT SERPL-MCNC: 0.82 MG/DL (ref 0.55–1.02)
EKG 12-LEAD: NORMAL
EOSINOPHIL # BLD AUTO: 0.14 X10(3)/MCL (ref 0–0.9)
EOSINOPHIL NFR BLD AUTO: 1.8 %
ERYTHROCYTE [DISTWIDTH] IN BLOOD BY AUTOMATED COUNT: 14.5 % (ref 11.5–17)
EST. AVERAGE GLUCOSE BLD GHB EST-MCNC: 197.3 MG/DL
GFR SERPLBLD CREATININE-BSD FMLA CKD-EPI: >60 MLS/MIN/1.73/M2
GLOBULIN SER-MCNC: 2.4 GM/DL (ref 2.4–3.5)
GLUCOSE SERPL-MCNC: 214 MG/DL (ref 82–115)
HBA1C MFR BLD: 8.5 %
HCT VFR BLD AUTO: 40.2 % (ref 37–47)
HGB BLD-MCNC: 13.7 GM/DL (ref 12–16)
IMM GRANULOCYTES # BLD AUTO: 0.03 X10(3)/MCL (ref 0–0.04)
IMM GRANULOCYTES NFR BLD AUTO: 0.4 %
LYMPHOCYTES # BLD AUTO: 2.41 X10(3)/MCL (ref 0.6–4.6)
LYMPHOCYTES NFR BLD AUTO: 30.1 %
MCH RBC QN AUTO: 31 PG (ref 27–31)
MCHC RBC AUTO-ENTMCNC: 34.1 MG/DL (ref 33–36)
MCV RBC AUTO: 91 FL (ref 80–94)
MONOCYTES # BLD AUTO: 0.75 X10(3)/MCL (ref 0.1–1.3)
MONOCYTES NFR BLD AUTO: 9.4 %
NEUTROPHILS # BLD AUTO: 4.6 X10(3)/MCL (ref 2.1–9.2)
NEUTROPHILS NFR BLD AUTO: 57.8 %
NRBC BLD AUTO-RTO: 0 %
PLATELET # BLD AUTO: 213 X10(3)/MCL (ref 130–400)
PMV BLD AUTO: 11.7 FL (ref 7.4–10.4)
POTASSIUM SERPL-SCNC: 4.5 MMOL/L (ref 3.5–5.1)
PR INTERVAL: NORMAL
PROT SERPL-MCNC: 6.4 GM/DL (ref 5.8–7.6)
PRT AXES: NORMAL
QRS DURATION: NORMAL
QT/QTC: NORMAL
RBC # BLD AUTO: 4.42 X10(6)/MCL (ref 4.2–5.4)
SODIUM SERPL-SCNC: 139 MMOL/L (ref 136–145)
VENTRICULAR RATE: NORMAL
WBC # SPEC AUTO: 8 X10(3)/MCL (ref 4.5–11.5)

## 2022-11-29 PROCEDURE — 36415 COLL VENOUS BLD VENIPUNCTURE: CPT

## 2022-11-29 PROCEDURE — 36415 PR COLLECTION VENOUS BLOOD,VENIPUNCTURE: ICD-10-PCS | Mod: ,,, | Performed by: GENERAL PRACTICE

## 2022-11-29 PROCEDURE — 36415 COLL VENOUS BLD VENIPUNCTURE: CPT | Mod: ,,, | Performed by: GENERAL PRACTICE

## 2022-11-29 PROCEDURE — 93000 POCT EKG 12-LEAD: ICD-10-PCS | Mod: ,,, | Performed by: GENERAL PRACTICE

## 2022-11-29 PROCEDURE — 93000 ELECTROCARDIOGRAM COMPLETE: CPT | Mod: ,,, | Performed by: GENERAL PRACTICE

## 2022-11-30 ENCOUNTER — OFFICE VISIT (OUTPATIENT)
Dept: PRIMARY CARE CLINIC | Facility: CLINIC | Age: 67
End: 2022-11-30
Payer: COMMERCIAL

## 2022-11-30 VITALS
HEART RATE: 86 BPM | HEIGHT: 66 IN | OXYGEN SATURATION: 97 % | WEIGHT: 246.81 LBS | DIASTOLIC BLOOD PRESSURE: 72 MMHG | SYSTOLIC BLOOD PRESSURE: 103 MMHG | BODY MASS INDEX: 39.66 KG/M2

## 2022-11-30 DIAGNOSIS — Z01.818 PREOPERATIVE CLEARANCE: Primary | Chronic | ICD-10-CM

## 2022-11-30 DIAGNOSIS — I10 PRIMARY HYPERTENSION: Chronic | ICD-10-CM

## 2022-11-30 DIAGNOSIS — E11.9 TYPE 2 DIABETES MELLITUS WITHOUT COMPLICATION, WITHOUT LONG-TERM CURRENT USE OF INSULIN: Chronic | ICD-10-CM

## 2022-11-30 DIAGNOSIS — H25.89 OTHER AGE-RELATED CATARACT, UNSPECIFIED LATERALITY: Chronic | ICD-10-CM

## 2022-11-30 PROCEDURE — 4010F PR ACE/ARB THEARPY RXD/TAKEN: ICD-10-PCS | Mod: CPTII,,, | Performed by: GENERAL PRACTICE

## 2022-11-30 PROCEDURE — 3078F DIAST BP <80 MM HG: CPT | Mod: CPTII,,, | Performed by: GENERAL PRACTICE

## 2022-11-30 PROCEDURE — 3008F BODY MASS INDEX DOCD: CPT | Mod: CPTII,,, | Performed by: GENERAL PRACTICE

## 2022-11-30 PROCEDURE — 1101F PR PT FALLS ASSESS DOC 0-1 FALLS W/OUT INJ PAST YR: ICD-10-PCS | Mod: CPTII,,, | Performed by: GENERAL PRACTICE

## 2022-11-30 PROCEDURE — 1159F MED LIST DOCD IN RCRD: CPT | Mod: CPTII,,, | Performed by: GENERAL PRACTICE

## 2022-11-30 PROCEDURE — 3288F PR FALLS RISK ASSESSMENT DOCUMENTED: ICD-10-PCS | Mod: CPTII,,, | Performed by: GENERAL PRACTICE

## 2022-11-30 PROCEDURE — 1101F PT FALLS ASSESS-DOCD LE1/YR: CPT | Mod: CPTII,,, | Performed by: GENERAL PRACTICE

## 2022-11-30 PROCEDURE — 3074F PR MOST RECENT SYSTOLIC BLOOD PRESSURE < 130 MM HG: ICD-10-PCS | Mod: CPTII,,, | Performed by: GENERAL PRACTICE

## 2022-11-30 PROCEDURE — 3008F PR BODY MASS INDEX (BMI) DOCUMENTED: ICD-10-PCS | Mod: CPTII,,, | Performed by: GENERAL PRACTICE

## 2022-11-30 PROCEDURE — 99214 PR OFFICE/OUTPT VISIT, EST, LEVL IV, 30-39 MIN: ICD-10-PCS | Mod: ,,, | Performed by: GENERAL PRACTICE

## 2022-11-30 PROCEDURE — 3061F NEG MICROALBUMINURIA REV: CPT | Mod: CPTII,,, | Performed by: GENERAL PRACTICE

## 2022-11-30 PROCEDURE — 1160F RVW MEDS BY RX/DR IN RCRD: CPT | Mod: CPTII,,, | Performed by: GENERAL PRACTICE

## 2022-11-30 PROCEDURE — 3288F FALL RISK ASSESSMENT DOCD: CPT | Mod: CPTII,,, | Performed by: GENERAL PRACTICE

## 2022-11-30 PROCEDURE — 1160F PR REVIEW ALL MEDS BY PRESCRIBER/CLIN PHARMACIST DOCUMENTED: ICD-10-PCS | Mod: CPTII,,, | Performed by: GENERAL PRACTICE

## 2022-11-30 PROCEDURE — 3066F NEPHROPATHY DOC TX: CPT | Mod: CPTII,,, | Performed by: GENERAL PRACTICE

## 2022-11-30 PROCEDURE — 1159F PR MEDICATION LIST DOCUMENTED IN MEDICAL RECORD: ICD-10-PCS | Mod: CPTII,,, | Performed by: GENERAL PRACTICE

## 2022-11-30 PROCEDURE — 3074F SYST BP LT 130 MM HG: CPT | Mod: CPTII,,, | Performed by: GENERAL PRACTICE

## 2022-11-30 PROCEDURE — 3078F PR MOST RECENT DIASTOLIC BLOOD PRESSURE < 80 MM HG: ICD-10-PCS | Mod: CPTII,,, | Performed by: GENERAL PRACTICE

## 2022-11-30 PROCEDURE — 3066F PR DOCUMENTATION OF TREATMENT FOR NEPHROPATHY: ICD-10-PCS | Mod: CPTII,,, | Performed by: GENERAL PRACTICE

## 2022-11-30 PROCEDURE — 3061F PR NEG MICROALBUMINURIA RESULT DOCUMENTED/REVIEW: ICD-10-PCS | Mod: CPTII,,, | Performed by: GENERAL PRACTICE

## 2022-11-30 PROCEDURE — 99214 OFFICE O/P EST MOD 30 MIN: CPT | Mod: ,,, | Performed by: GENERAL PRACTICE

## 2022-11-30 PROCEDURE — 4010F ACE/ARB THERAPY RXD/TAKEN: CPT | Mod: CPTII,,, | Performed by: GENERAL PRACTICE

## 2022-11-30 NOTE — ASSESSMENT & PLAN NOTE
Blood pressures appear to be adequately controlled with current treatment plan, including prescription blood pressure medication. Continue medical management and continue home blood pressure checks.  Blood pressure should be checked as discussed during medical visits, and average blood pressure should be no greater than 130/80.

## 2022-11-30 NOTE — ASSESSMENT & PLAN NOTE
Based on today's evaluation, there does not seem to be any contraindication to the patient undergoing cataract surgery in which local anesthesia during which monitored intravenous sedation will be utilized.  Patient is medically stable to proceed with surgery as planned.

## 2022-11-30 NOTE — PROGRESS NOTES
"Subjective:       Patient ID: Lola Arana is a 67 y.o. female.    Chief Complaint: Follow-up (Surgical Clearance)    Patient presents to the clinic for preoperative clearance for cataract surgery, and for recheck of her diabetes mellitus.  Recently, over the last several months she has had less than optimal control for diabetes with A1c level or a%.  Current A1c still over 8%, at 8.6%.  Otherwise, no complaints or problems.    Last wellness exam was 08/30/2022.      Review of Systems   Constitutional: Negative.  Negative for fatigue and fever.   HENT: Negative.  Negative for sore throat and trouble swallowing.    Eyes: Negative.  Negative for redness and visual disturbance.   Respiratory: Negative.  Negative for chest tightness and shortness of breath.    Cardiovascular: Negative.  Negative for chest pain.   Gastrointestinal: Negative.  Negative for abdominal pain, blood in stool and nausea.   Endocrine: Negative.  Negative for cold intolerance, heat intolerance and polyuria.   Genitourinary: Negative.    Musculoskeletal: Negative.  Negative for arthralgias, gait problem and joint swelling.   Integumentary:  Negative for rash. Negative.   Neurological: Negative.  Negative for dizziness, weakness and headaches.   Psychiatric/Behavioral: Negative.  Negative for agitation and dysphoric mood.        Objective:     Vitals:    11/30/22 1340   BP: 103/72   BP Location: Left arm   Patient Position: Sitting   BP Method: X-Large (Automatic)   Pulse: 86   SpO2: 97%   Weight: 111.9 kg (246 lb 12.8 oz)   Height: 5' 6" (1.676 m)   Body mass index is 39.83 kg/m².     Physical Exam  Constitutional:       Appearance: Normal appearance.   HENT:      Head: Normocephalic.      Mouth/Throat:      Pharynx: Oropharynx is clear.   Eyes:      Conjunctiva/sclera: Conjunctivae normal.      Pupils: Pupils are equal, round, and reactive to light.   Cardiovascular:      Rate and Rhythm: Normal rate and regular rhythm.      Heart sounds: " Normal heart sounds.   Pulmonary:      Effort: Pulmonary effort is normal.      Breath sounds: Normal breath sounds.   Abdominal:      General: Abdomen is flat.      Palpations: Abdomen is soft.   Musculoskeletal:         General: Normal range of motion.      Cervical back: Neck supple.   Skin:     General: Skin is warm and dry.   Neurological:      General: No focal deficit present.      Mental Status: She is oriented to person, place, and time.   Psychiatric:         Mood and Affect: Mood normal.         Behavior: Behavior normal.         Thought Content: Thought content normal.         Judgment: Judgment normal.         Lab Results   Component Value Date     11/29/2022    K 4.5 11/29/2022    CO2 23 11/29/2022    BUN 18.8 11/29/2022    CREATININE 0.82 11/29/2022    CALCIUM 9.7 11/29/2022    ALBUMIN 4.0 11/29/2022    BILITOT 0.6 11/29/2022    ALKPHOS 92 11/29/2022    AST 16 11/29/2022    ALT 25 11/29/2022    EGFRNONAA >60 07/20/2022     Lab Results   Component Value Date    WBC 8.0 11/29/2022    RBC 4.42 11/29/2022    HGB 13.7 11/29/2022    HCT 40.2 11/29/2022    MCV 91.0 11/29/2022    RDW 14.5 11/29/2022     11/29/2022      Lab Results   Component Value Date    CHOL 208 (H) 07/20/2022    CHOL 125 02/10/2021    TRIG 156 (H) 07/20/2022    TRIG 144 02/10/2021    HDL 57 07/20/2022    HDL 46 02/10/2021    .00 07/20/2022    TOTALCHOLEST 4 07/20/2022     Lab Results   Component Value Date    TSH 0.9110 07/20/2022     Lab Results   Component Value Date    HGBA1C 8.5 (H) 11/29/2022              Assessment:     Problem List Items Addressed This Visit          Ophtho    Other age-related cataract       Cardiac/Vascular    Primary hypertension (Chronic)    Current Assessment & Plan     Blood pressures appear to be adequately controlled with current treatment plan, including prescription blood pressure medication. Continue medical management and continue home blood pressure checks.  Blood pressure should be  checked as discussed during medical visits, and average blood pressure should be no greater than 130/80.            Endocrine    Type 2 diabetes mellitus without complication, without long-term current use of insulin (Chronic)    Current Assessment & Plan     Still less than adequate control, current hemoglobin A1c 8.6%.         Relevant Orders    Hemoglobin A1C       Other    Preoperative clearance - Primary    Current Assessment & Plan     Based on today's evaluation, there does not seem to be any contraindication to the patient undergoing cataract surgery in which local anesthesia during which monitored intravenous sedation will be utilized.  Patient is medically stable to proceed with surgery as planned.               Medication List with Changes/Refills   Current Medications    ATORVASTATIN (LIPITOR) 20 MG TABLET    Take 1 tablet (20 mg total) by mouth once daily.    GABAPENTIN (NEURONTIN) 300 MG CAPSULE    Take 1 capsule (300 mg total) by mouth 3 (three) times daily.    LIDOCAINE (LIDODERM) 5 %    1 patch remove after 12 hours    LISINOPRIL (PRINIVIL,ZESTRIL) 20 MG TABLET    Take 20 mg by mouth once daily.    LORAZEPAM (ATIVAN) 0.5 MG TABLET    Take 1 tablet (0.5 mg total) by mouth every 6 (six) hours as needed for Anxiety.    METFORMIN (GLUCOPHAGE) 1000 MG TABLET    Take 1,000 mg by mouth once daily.    METOPROLOL SUCCINATE (TOPROL-XL) 100 MG 24 HR TABLET    Take 100 mg by mouth once daily.    MONTELUKAST (SINGULAIR) 10 MG TABLET    Take 1 tablet (10 mg total) by mouth once daily.    TRULICITY 0.75 MG/0.5 ML PEN INJECTOR        VENLAFAXINE (EFFEXOR-XR) 150 MG CP24    Take 1 capsule (150 mg total) by mouth once daily.     Plan:             Follow up in about 3 months (around 2/28/2023) for DM F/up.

## 2023-01-23 ENCOUNTER — PATIENT MESSAGE (OUTPATIENT)
Dept: ADMINISTRATIVE | Facility: HOSPITAL | Age: 68
End: 2023-01-23
Payer: MEDICARE

## 2023-02-20 ENCOUNTER — CLINICAL SUPPORT (OUTPATIENT)
Dept: PRIMARY CARE CLINIC | Facility: CLINIC | Age: 68
End: 2023-02-20
Payer: MEDICARE

## 2023-02-20 DIAGNOSIS — E11.9 TYPE 2 DIABETES MELLITUS WITHOUT COMPLICATION, WITHOUT LONG-TERM CURRENT USE OF INSULIN: Primary | Chronic | ICD-10-CM

## 2023-02-20 DIAGNOSIS — E11.9 TYPE 2 DIABETES MELLITUS WITHOUT COMPLICATION, WITHOUT LONG-TERM CURRENT USE OF INSULIN: Chronic | ICD-10-CM

## 2023-02-20 LAB
EST. AVERAGE GLUCOSE BLD GHB EST-MCNC: 200.1 MG/DL
HBA1C MFR BLD: 8.6 %

## 2023-02-20 PROCEDURE — 36415 COLL VENOUS BLD VENIPUNCTURE: CPT

## 2023-02-20 PROCEDURE — 36415 COLL VENOUS BLD VENIPUNCTURE: CPT | Mod: ,,, | Performed by: GENERAL PRACTICE

## 2023-02-20 PROCEDURE — 36415 PR COLLECTION VENOUS BLOOD,VENIPUNCTURE: ICD-10-PCS | Mod: ,,, | Performed by: GENERAL PRACTICE

## 2023-02-20 NOTE — PROGRESS NOTES
Wellness labs drawn from the hand with butterfly needle. Pt expressed no pain and tolerated well.

## 2023-02-27 ENCOUNTER — PATIENT MESSAGE (OUTPATIENT)
Dept: PRIMARY CARE CLINIC | Facility: CLINIC | Age: 68
End: 2023-02-27
Payer: MEDICARE

## 2023-02-27 DIAGNOSIS — E11.9 TYPE 2 DIABETES MELLITUS WITHOUT COMPLICATION, WITHOUT LONG-TERM CURRENT USE OF INSULIN: Primary | ICD-10-CM

## 2023-02-27 RX ORDER — METFORMIN HYDROCHLORIDE 1000 MG/1
1000 TABLET ORAL DAILY
Qty: 90 TABLET | Refills: 3 | Status: SHIPPED | OUTPATIENT
Start: 2023-02-27 | End: 2023-03-16 | Stop reason: SDUPTHER

## 2023-03-02 ENCOUNTER — PATIENT MESSAGE (OUTPATIENT)
Dept: PRIMARY CARE CLINIC | Facility: CLINIC | Age: 68
End: 2023-03-02
Payer: MEDICARE

## 2023-03-02 DIAGNOSIS — I10 PRIMARY HYPERTENSION: Primary | ICD-10-CM

## 2023-03-02 RX ORDER — METOPROLOL SUCCINATE 100 MG/1
100 TABLET, EXTENDED RELEASE ORAL DAILY
Qty: 90 TABLET | Refills: 3 | Status: SHIPPED | OUTPATIENT
Start: 2023-03-02 | End: 2023-05-25 | Stop reason: SDUPTHER

## 2023-03-03 ENCOUNTER — PATIENT MESSAGE (OUTPATIENT)
Dept: PRIMARY CARE CLINIC | Facility: CLINIC | Age: 68
End: 2023-03-03
Payer: MEDICARE

## 2023-03-15 PROBLEM — E11.65 TYPE 2 DIABETES MELLITUS WITH HYPERGLYCEMIA, WITHOUT LONG-TERM CURRENT USE OF INSULIN: Chronic | Status: ACTIVE | Noted: 2022-06-18

## 2023-03-15 PROBLEM — J30.2 SEASONAL ALLERGIES: Status: ACTIVE | Noted: 2023-03-15

## 2023-03-15 NOTE — PROGRESS NOTES
"Subjective:       Patient ID: Lola Arana is a 67 y.o. female.    Chief Complaint: Follow-up (DM follow up. Last A1C 2/20/23)    Patient presents to the clinic today for chronic condition follow-up.  Doing well, no specific complaints, tolerating all medications, reporting no significant side effects or problems.  Uncontrolled diabetes, previous A1c six months ago 8.6%, prescribed Trulicity but can not afford the medication, currently taking only metformin.    Last wellness exam was 08/30/2022.    Review of Systems   Constitutional: Negative.  Negative for fatigue and fever.   HENT: Negative.  Negative for sore throat and trouble swallowing.    Eyes: Negative.  Negative for redness and visual disturbance.   Respiratory: Negative.  Negative for chest tightness and shortness of breath.    Cardiovascular: Negative.  Negative for chest pain.   Gastrointestinal: Negative.  Negative for abdominal pain, blood in stool and nausea.   Endocrine: Negative.  Negative for cold intolerance, heat intolerance and polyuria.   Genitourinary: Negative.    Musculoskeletal: Negative.  Negative for arthralgias, gait problem and joint swelling.   Integumentary:  Negative for rash. Negative.   Neurological: Negative.  Negative for dizziness, weakness and headaches.   Psychiatric/Behavioral: Negative.  Negative for agitation and dysphoric mood.        Objective:     Vitals:    03/16/23 1050   BP: 125/86   Pulse: 84   SpO2: 97%   Weight: 110 kg (242 lb 9.6 oz)   Height: 5' 6" (1.676 m)   Body mass index is 39.16 kg/m².     Physical Exam  Constitutional:       Appearance: Normal appearance.   HENT:      Head: Normocephalic.      Mouth/Throat:      Pharynx: Oropharynx is clear.   Eyes:      Conjunctiva/sclera: Conjunctivae normal.      Pupils: Pupils are equal, round, and reactive to light.   Cardiovascular:      Rate and Rhythm: Normal rate and regular rhythm.      Heart sounds: Normal heart sounds.   Pulmonary:      Effort: Pulmonary " effort is normal.      Breath sounds: Normal breath sounds.   Abdominal:      General: Abdomen is flat.      Palpations: Abdomen is soft.   Musculoskeletal:         General: Normal range of motion.      Cervical back: Neck supple.   Skin:     General: Skin is warm and dry.   Neurological:      General: No focal deficit present.      Mental Status: She is oriented to person, place, and time.   Psychiatric:         Mood and Affect: Mood normal.         Behavior: Behavior normal.         Thought Content: Thought content normal.         Judgment: Judgment normal.         Lab Results   Component Value Date     11/29/2022    K 4.5 11/29/2022    CO2 23 11/29/2022    BUN 18.8 11/29/2022    CREATININE 0.82 11/29/2022    CALCIUM 9.7 11/29/2022    ALBUMIN 4.0 11/29/2022    BILITOT 0.6 11/29/2022    ALKPHOS 92 11/29/2022    AST 16 11/29/2022    ALT 25 11/29/2022    EGFRNONAA >60 07/20/2022     Lab Results   Component Value Date    WBC 8.0 11/29/2022    RBC 4.42 11/29/2022    HGB 13.7 11/29/2022    HCT 40.2 11/29/2022    MCV 91.0 11/29/2022    RDW 14.5 11/29/2022     11/29/2022      Lab Results   Component Value Date    CHOL 208 (H) 07/20/2022    CHOL 125 02/10/2021    TRIG 156 (H) 07/20/2022    TRIG 144 02/10/2021    HDL 57 07/20/2022    HDL 46 02/10/2021    .00 07/20/2022    TOTALCHOLEST 4 07/20/2022     Lab Results   Component Value Date    TSH 0.9110 07/20/2022     Lab Results   Component Value Date    HGBA1C 8.6 (H) 02/20/2023          Assessment:     Problem List Items Addressed This Visit          Psychiatric    Generalized anxiety disorder (Chronic)    Current Assessment & Plan     Prescribed Effexor  mg daily, lorazepam 0.5 mg, occasional use.  Patient reports stability of moods, and effectiveness of medications, including no agitation, significant somnolence, or significant bouts of anxiety or depression.  Patient is not having any sleep disturbance.  Current treatment plan will continue, with  plans to discuss any significant changes in patient's status if necessary if anything changes in the future.            Cardiac/Vascular    Dyslipidemia (Chronic)    Current Assessment & Plan     Prescribed Lipitor 20 mg daily.  Most recent cholesterol/lipid blood testing shows adequate control, continue current treatment plan, including prescription medications if prescribed, and/or diet high in fiber, low in saturated fats as discussed during clinic visit.         Primary hypertension - Primary (Chronic)    Current Assessment & Plan     Prescribed lisinopril 20 mg daily, Toprol- mg daily.  Blood pressures appear to be adequately controlled with current treatment plan, including prescription blood pressure medication. Continue medical management and continue home blood pressure checks.  Blood pressure should be checked as discussed during medical visits, and average blood pressure should be no greater than 130/80.         Relevant Medications    lisinopriL (PRINIVIL,ZESTRIL) 20 MG tablet       Endocrine    Type 2 diabetes mellitus with hyperglycemia, without long-term current use of insulin (Chronic)    Current Assessment & Plan     Prescribed Trulicity 0.75 mg q.week, metformin 1000 mg daily.  Previous A1c six months ago 8.6%.  Able to for Trulicity, so she is not taking it, therefore I will increase her metformin to 1000 b.i.d. and check her back in three months with another A1c.         Relevant Medications    metFORMIN (GLUCOPHAGE) 1000 MG tablet    Severe obesity (BMI 35.0-39.9) with comorbidity (Chronic)    Current Assessment & Plan      Weight loss, healthy dietary choices, increased activity/exercise are recommended.  To lose weight, it is generally recommended to restrict calories to approximately 1500 calories per day to lose 1 lb per week, and approximately 1200 calories per day to lose up to 2 lb per week.  Generally, this is a healthy amount of weight to lose, and an appropriate amount of time to  "lose this amount of weight.  Adding exercise will assist in losing weight, particularly aerobic exercise such as walking.  However, resistance training, or lifting weights" over time may assistant weight loss by increasing basal metabolic rate, or the rate at which your body burns calories during periods of inactivity.    Keep track of BMI (body mass index), below 25 is considered normal, over 25 but below 30 is considered overweight, anything over 30 is considered moderately obese, over 35 severely obese, and over 40 is characterized as morbidly obese.          Other Visit Diagnoses       Type 2 diabetes mellitus without complication, without long-term current use of insulin        Relevant Medications    metFORMIN (GLUCOPHAGE) 1000 MG tablet    Other Relevant Orders    Hemoglobin A1C               Medication List with Changes/Refills   Current Medications    ATORVASTATIN (LIPITOR) 20 MG TABLET    Take 1 tablet (20 mg total) by mouth once daily.    GABAPENTIN (NEURONTIN) 300 MG CAPSULE    Take 1 capsule (300 mg total) by mouth 3 (three) times daily.    LIDOCAINE (LIDODERM) 5 %    1 patch remove after 12 hours    LORAZEPAM (ATIVAN) 0.5 MG TABLET    Take 1 tablet (0.5 mg total) by mouth every 6 (six) hours as needed for Anxiety.    METOPROLOL SUCCINATE (TOPROL-XL) 100 MG 24 HR TABLET    Take 1 tablet (100 mg total) by mouth once daily.    MONTELUKAST (SINGULAIR) 10 MG TABLET    Take 1 tablet (10 mg total) by mouth once daily.    VENLAFAXINE (EFFEXOR-XR) 150 MG CP24    Take 1 capsule (150 mg total) by mouth once daily.   Changed and/or Refilled Medications    Modified Medication Previous Medication    LISINOPRIL (PRINIVIL,ZESTRIL) 20 MG TABLET lisinopriL (PRINIVIL,ZESTRIL) 20 MG tablet       Take 1 tablet (20 mg total) by mouth once daily.    Take 20 mg by mouth once daily.    METFORMIN (GLUCOPHAGE) 1000 MG TABLET metFORMIN (GLUCOPHAGE) 1000 MG tablet       Take 1 tablet (1,000 mg total) by mouth 2 (two) times daily " with meals.    Take 1 tablet (1,000 mg total) by mouth once daily.   Discontinued Medications    TRULICITY 0.75 MG/0.5 ML PEN INJECTOR         Plan:             Follow up in about 14 weeks (around 6/22/2023) for DM F/up.

## 2023-03-15 NOTE — ASSESSMENT & PLAN NOTE
Prescribed Lipitor 20 mg daily.  Most recent cholesterol/lipid blood testing shows adequate control, continue current treatment plan, including prescription medications if prescribed, and/or diet high in fiber, low in saturated fats as discussed during clinic visit.

## 2023-03-15 NOTE — ASSESSMENT & PLAN NOTE
Prescribed Trulicity 0.75 mg q.week, metformin 1000 mg daily.  Previous A1c six months ago 8.6%.  Able to for Trulicity, so she is not taking it, therefore I will increase her metformin to 1000 b.i.d. and check her back in three months with another A1c.

## 2023-03-15 NOTE — ASSESSMENT & PLAN NOTE
Prescribed Effexor  mg daily, lorazepam 0.5 mg, occasional use.  Patient reports stability of moods, and effectiveness of medications, including no agitation, significant somnolence, or significant bouts of anxiety or depression.  Patient is not having any sleep disturbance.  Current treatment plan will continue, with plans to discuss any significant changes in patient's status if necessary if anything changes in the future.

## 2023-03-16 ENCOUNTER — OFFICE VISIT (OUTPATIENT)
Dept: PRIMARY CARE CLINIC | Facility: CLINIC | Age: 68
End: 2023-03-16
Payer: MEDICARE

## 2023-03-16 VITALS
SYSTOLIC BLOOD PRESSURE: 125 MMHG | OXYGEN SATURATION: 97 % | BODY MASS INDEX: 39 KG/M2 | DIASTOLIC BLOOD PRESSURE: 86 MMHG | WEIGHT: 242.63 LBS | HEIGHT: 66 IN | HEART RATE: 84 BPM

## 2023-03-16 DIAGNOSIS — E11.65 TYPE 2 DIABETES MELLITUS WITH HYPERGLYCEMIA, WITHOUT LONG-TERM CURRENT USE OF INSULIN: Chronic | ICD-10-CM

## 2023-03-16 DIAGNOSIS — I10 PRIMARY HYPERTENSION: Primary | Chronic | ICD-10-CM

## 2023-03-16 DIAGNOSIS — F41.1 GENERALIZED ANXIETY DISORDER: Chronic | ICD-10-CM

## 2023-03-16 DIAGNOSIS — E78.5 DYSLIPIDEMIA: Chronic | ICD-10-CM

## 2023-03-16 DIAGNOSIS — E11.9 TYPE 2 DIABETES MELLITUS WITHOUT COMPLICATION, WITHOUT LONG-TERM CURRENT USE OF INSULIN: ICD-10-CM

## 2023-03-16 DIAGNOSIS — E66.01 SEVERE OBESITY (BMI 35.0-39.9) WITH COMORBIDITY: ICD-10-CM

## 2023-03-16 PROCEDURE — 99213 PR OFFICE/OUTPT VISIT, EST, LEVL III, 20-29 MIN: ICD-10-PCS | Mod: ,,, | Performed by: GENERAL PRACTICE

## 2023-03-16 PROCEDURE — 99213 OFFICE O/P EST LOW 20 MIN: CPT | Mod: ,,, | Performed by: GENERAL PRACTICE

## 2023-03-16 RX ORDER — LISINOPRIL 20 MG/1
20 TABLET ORAL DAILY
Qty: 90 TABLET | Refills: 3 | Status: SHIPPED | OUTPATIENT
Start: 2023-03-16 | End: 2024-04-01 | Stop reason: SDUPTHER

## 2023-03-16 RX ORDER — METFORMIN HYDROCHLORIDE 1000 MG/1
1000 TABLET ORAL 2 TIMES DAILY WITH MEALS
Qty: 180 TABLET | Refills: 3 | Status: SHIPPED | OUTPATIENT
Start: 2023-03-16 | End: 2024-03-15

## 2023-03-17 NOTE — ASSESSMENT & PLAN NOTE
" Weight loss, healthy dietary choices, increased activity/exercise are recommended.  To lose weight, it is generally recommended to restrict calories to approximately 1500 calories per day to lose 1 lb per week, and approximately 1200 calories per day to lose up to 2 lb per week.  Generally, this is a healthy amount of weight to lose, and an appropriate amount of time to lose this amount of weight.  Adding exercise will assist in losing weight, particularly aerobic exercise such as walking.  However, resistance training, or lifting weights" over time may assistant weight loss by increasing basal metabolic rate, or the rate at which your body burns calories during periods of inactivity.    Keep track of BMI (body mass index), below 25 is considered normal, over 25 but below 30 is considered overweight, anything over 30 is considered moderately obese, over 35 severely obese, and over 40 is characterized as morbidly obese.  "

## 2023-05-01 ENCOUNTER — PATIENT MESSAGE (OUTPATIENT)
Dept: ADMINISTRATIVE | Facility: HOSPITAL | Age: 68
End: 2023-05-01
Payer: MEDICARE

## 2023-05-25 ENCOUNTER — PATIENT MESSAGE (OUTPATIENT)
Dept: PRIMARY CARE CLINIC | Facility: CLINIC | Age: 68
End: 2023-05-25
Payer: MEDICARE

## 2023-05-25 DIAGNOSIS — F41.1 GENERALIZED ANXIETY DISORDER: Chronic | ICD-10-CM

## 2023-05-25 DIAGNOSIS — I10 PRIMARY HYPERTENSION: ICD-10-CM

## 2023-05-25 RX ORDER — LORAZEPAM 0.5 MG/1
0.5 TABLET ORAL EVERY 6 HOURS PRN
Qty: 30 TABLET | Refills: 2 | Status: SHIPPED | OUTPATIENT
Start: 2023-05-25 | End: 2023-10-23 | Stop reason: SDUPTHER

## 2023-05-25 RX ORDER — METOPROLOL SUCCINATE 100 MG/1
100 TABLET, EXTENDED RELEASE ORAL DAILY
Qty: 90 TABLET | Refills: 3 | Status: SHIPPED | OUTPATIENT
Start: 2023-05-25 | End: 2024-05-24

## 2023-06-19 ENCOUNTER — CLINICAL SUPPORT (OUTPATIENT)
Dept: PRIMARY CARE CLINIC | Facility: CLINIC | Age: 68
End: 2023-06-19
Payer: MEDICARE

## 2023-06-19 DIAGNOSIS — E11.9 TYPE 2 DIABETES MELLITUS WITHOUT COMPLICATION, WITHOUT LONG-TERM CURRENT USE OF INSULIN: ICD-10-CM

## 2023-06-19 LAB
EST. AVERAGE GLUCOSE BLD GHB EST-MCNC: 203 MG/DL
HBA1C MFR BLD: 8.7 %

## 2023-06-19 PROCEDURE — 36415 PR COLLECTION VENOUS BLOOD,VENIPUNCTURE: ICD-10-PCS | Mod: ,,, | Performed by: GENERAL PRACTICE

## 2023-06-19 PROCEDURE — 36415 COLL VENOUS BLD VENIPUNCTURE: CPT | Mod: ,,, | Performed by: GENERAL PRACTICE

## 2023-06-19 PROCEDURE — 83036 HEMOGLOBIN GLYCOSYLATED A1C: CPT | Performed by: GENERAL PRACTICE

## 2023-06-19 PROCEDURE — 36415 COLL VENOUS BLD VENIPUNCTURE: CPT

## 2023-06-19 NOTE — PROGRESS NOTES
Patient presented today for a Nurse Visit to have blood work drawn. One attempted draw to left arm with an butterfly  needle, pt tolerated well with no complaints.     Itraconazole Counseling:  I discussed with the patient the risks of itraconazole including but not limited to liver damage, nausea/vomiting, neuropathy, and severe allergy.  The patient understands that this medication is best absorbed when taken with acidic beverages such as non-diet cola or ginger ale.  The patient understands that monitoring is required including baseline LFTs and repeat LFTs at intervals.  The patient understands that they are to contact us or the primary physician if concerning signs are noted.

## 2023-06-21 PROBLEM — F41.8 MIXED ANXIETY DEPRESSIVE DISORDER: Status: ACTIVE | Noted: 2023-06-21

## 2023-06-22 ENCOUNTER — OFFICE VISIT (OUTPATIENT)
Dept: PRIMARY CARE CLINIC | Facility: CLINIC | Age: 68
End: 2023-06-22
Payer: MEDICARE

## 2023-06-22 VITALS
RESPIRATION RATE: 18 BRPM | HEIGHT: 66 IN | SYSTOLIC BLOOD PRESSURE: 124 MMHG | BODY MASS INDEX: 38.57 KG/M2 | HEART RATE: 80 BPM | WEIGHT: 240 LBS | DIASTOLIC BLOOD PRESSURE: 85 MMHG | OXYGEN SATURATION: 98 %

## 2023-06-22 DIAGNOSIS — R42 DIZZINESS: ICD-10-CM

## 2023-06-22 DIAGNOSIS — F41.1 GENERALIZED ANXIETY DISORDER: ICD-10-CM

## 2023-06-22 DIAGNOSIS — E78.5 DYSLIPIDEMIA: Chronic | ICD-10-CM

## 2023-06-22 DIAGNOSIS — E78.5 HYPERLIPIDEMIA, UNSPECIFIED HYPERLIPIDEMIA TYPE: ICD-10-CM

## 2023-06-22 DIAGNOSIS — I10 PRIMARY HYPERTENSION: Chronic | ICD-10-CM

## 2023-06-22 DIAGNOSIS — Z12.11 SCREENING FOR COLON CANCER: ICD-10-CM

## 2023-06-22 DIAGNOSIS — E11.65 TYPE 2 DIABETES MELLITUS WITH HYPERGLYCEMIA, WITHOUT LONG-TERM CURRENT USE OF INSULIN: Primary | Chronic | ICD-10-CM

## 2023-06-22 DIAGNOSIS — R79.9 ABNORMAL BLOOD CHEMISTRY: ICD-10-CM

## 2023-06-22 DIAGNOSIS — Z00.00 WELLNESS EXAMINATION: ICD-10-CM

## 2023-06-22 PROCEDURE — 99214 PR OFFICE/OUTPT VISIT, EST, LEVL IV, 30-39 MIN: ICD-10-PCS | Mod: ,,, | Performed by: GENERAL PRACTICE

## 2023-06-22 PROCEDURE — 99214 OFFICE O/P EST MOD 30 MIN: CPT | Mod: ,,, | Performed by: GENERAL PRACTICE

## 2023-06-22 RX ORDER — VENLAFAXINE HYDROCHLORIDE 150 MG/1
150 CAPSULE, EXTENDED RELEASE ORAL DAILY
Qty: 90 CAPSULE | Refills: 3 | Status: SHIPPED | OUTPATIENT
Start: 2023-06-22 | End: 2024-06-21

## 2023-06-22 RX ORDER — ATORVASTATIN CALCIUM 20 MG/1
20 TABLET, FILM COATED ORAL DAILY
Qty: 90 TABLET | Refills: 3 | Status: SHIPPED | OUTPATIENT
Start: 2023-06-22 | End: 2024-04-01 | Stop reason: SDUPTHER

## 2023-06-22 RX ORDER — MECLIZINE HYDROCHLORIDE 25 MG/1
25 TABLET ORAL 3 TIMES DAILY PRN
Qty: 30 TABLET | Refills: 5 | Status: SHIPPED | OUTPATIENT
Start: 2023-06-22 | End: 2023-12-19

## 2023-06-22 RX ORDER — GLIPIZIDE 10 MG/1
10 TABLET, FILM COATED, EXTENDED RELEASE ORAL
Qty: 90 TABLET | Refills: 3 | Status: SHIPPED | OUTPATIENT
Start: 2023-06-22 | End: 2024-06-21

## 2023-06-22 NOTE — ASSESSMENT & PLAN NOTE
Prescribed metformin 1000 mg b.i.d..    Component Ref Range & Units 2 d ago 4 mo ago 6 mo ago 11 mo ago   Hemoglobin A1c <=7.0 % 8.7 High   8.6 High   8.5 High   8.4 High     Estimated Average Glucose mg/dL 203.0  200.1  197.3  194.4      Currently, diabetes is not controlled with an A1c of 8.7%.  Add glipizide 10 mg daily, has never used so fine your is for four, we did discuss the potential side effects of low blood sugar, she will watch for those symptoms or side effects and let me know if she has any problems.

## 2023-06-22 NOTE — ASSESSMENT & PLAN NOTE
Prescribed lisinopril 20 mg daily, Toprol- mg daily.  Blood pressures appear to be adequately controlled with current treatment plan, including prescription blood pressure medication. Continue medical management and continue home blood pressure checks.  Blood pressure should be checked as discussed during medical visits, and average blood pressure should be no greater than 130/80.

## 2023-06-22 NOTE — PROGRESS NOTES
"Subjective:       Patient ID: Lola Arana is a 68 y.o. female.    Chief Complaint: Follow-up and Diabetes    Patient presents to the clinic today for chronic condition follow-up, and diabetes recheck.  Patient on metformin 1000 mg twice daily, prescribed Trulicity but not able to afford the medication.    Last wellness exam was 08/30/2022.    Review of Systems   Constitutional: Negative.  Negative for fatigue and fever.   HENT: Negative.  Negative for sore throat and trouble swallowing.    Eyes: Negative.  Negative for redness and visual disturbance.   Respiratory: Negative.  Negative for chest tightness and shortness of breath.    Cardiovascular: Negative.  Negative for chest pain.   Gastrointestinal: Negative.  Negative for abdominal pain, blood in stool and nausea.   Endocrine: Negative.  Negative for cold intolerance, heat intolerance and polyuria.   Genitourinary: Negative.    Musculoskeletal: Negative.  Negative for arthralgias, gait problem and joint swelling.   Integumentary:  Negative for rash. Negative.   Neurological: Negative.  Negative for dizziness, weakness and headaches.   Psychiatric/Behavioral: Negative.  Negative for agitation and dysphoric mood.        Objective:   Visit Vitals  /85   Pulse 80   Resp 18   Ht 5' 6" (1.676 m)   Wt 108.9 kg (240 lb)   SpO2 98%   BMI 38.74 kg/m²        Physical Exam  Constitutional:       Appearance: Normal appearance.   Eyes:      Conjunctiva/sclera: Conjunctivae normal.   Cardiovascular:      Rate and Rhythm: Normal rate and regular rhythm.   Pulmonary:      Effort: Pulmonary effort is normal.      Breath sounds: Normal breath sounds.   Skin:     General: Skin is warm and dry.   Neurological:      Mental Status: She is alert.   Psychiatric:         Mood and Affect: Mood normal.         Behavior: Behavior normal.         Lab Results   Component Value Date     11/29/2022    K 4.5 11/29/2022    CO2 23 11/29/2022    BUN 18.8 11/29/2022    CREATININE " 0.82 11/29/2022    CALCIUM 9.7 11/29/2022    ALBUMIN 4.0 11/29/2022    BILITOT 0.6 11/29/2022    ALKPHOS 92 11/29/2022    AST 16 11/29/2022    ALT 25 11/29/2022    EGFRNORACEVR >60 11/29/2022     Lab Results   Component Value Date    WBC 8.0 11/29/2022    RBC 4.42 11/29/2022    HGB 13.7 11/29/2022    HCT 40.2 11/29/2022    MCV 91.0 11/29/2022    RDW 14.5 11/29/2022     11/29/2022      Lab Results   Component Value Date    CHOL 208 (H) 07/20/2022    TRIG 156 (H) 07/20/2022    HDL 57 07/20/2022    .00 07/20/2022    TOTALCHOLEST 4 07/20/2022     Lab Results   Component Value Date    TSH 0.9110 07/20/2022     Lab Results   Component Value Date    HGBA1C 8.7 (H) 06/19/2023          Assessment:     Problem List Items Addressed This Visit          Psychiatric    Generalized anxiety disorder (Chronic)    Relevant Medications    venlafaxine (EFFEXOR-XR) 150 MG Cp24       Cardiac/Vascular    Dyslipidemia (Chronic)    Current Assessment & Plan     Prescribed Lipitor 20 mg daily.  Most recent cholesterol/lipid blood testing shows adequate control, continue current treatment plan, including prescription medications if prescribed, and/or diet high in fiber, low in saturated fats as discussed during clinic visit.         Relevant Medications    atorvastatin (LIPITOR) 20 MG tablet    glipiZIDE (GLUCOTROL) 10 MG TR24    Other Relevant Orders    TSH    Primary hypertension (Chronic)    Current Assessment & Plan     Prescribed lisinopril 20 mg daily, Toprol- mg daily.  Blood pressures appear to be adequately controlled with current treatment plan, including prescription blood pressure medication. Continue medical management and continue home blood pressure checks.  Blood pressure should be checked as discussed during medical visits, and average blood pressure should be no greater than 130/80.         Relevant Orders    Comprehensive Metabolic Panel       Endocrine    Type 2 diabetes mellitus with hyperglycemia,  without long-term current use of insulin - Primary (Chronic)    Current Assessment & Plan     Prescribed metformin 1000 mg b.i.d..    Component Ref Range & Units 2 d ago 4 mo ago 6 mo ago 11 mo ago   Hemoglobin A1c <=7.0 % 8.7 High   8.6 High   8.5 High   8.4 High     Estimated Average Glucose mg/dL 203.0  200.1  197.3  194.4      Currently, diabetes is not controlled with an A1c of 8.7%.  Add glipizide 10 mg daily, has never used so fine your is for four, we did discuss the potential side effects of low blood sugar, she will watch for those symptoms or side effects and let me know if she has any problems.         Relevant Medications    glipiZIDE (GLUCOTROL) 10 MG TR24    Other Relevant Orders    Hemoglobin A1C    Microalbumin/Creatinine Ratio, Urine     Other Visit Diagnoses       Hyperlipidemia, unspecified hyperlipidemia type        Relevant Medications    atorvastatin (LIPITOR) 20 MG tablet    glipiZIDE (GLUCOTROL) 10 MG TR24    Other Relevant Orders    Lipid Panel    Dizziness        Relevant Medications    meclizine (ANTIVERT) 25 mg tablet    Screening for colon cancer        Cologuard ordered    Relevant Orders    Cologuard Screening (Multitarget Stool DNA)    Wellness examination        This diagnosis does not apply to this visit, wellness exam with pre visit labs will be scheduled/ordered for future visit.    Relevant Orders    TSH    Lipid Panel    Comprehensive Metabolic Panel    Hemoglobin A1C    CBC Auto Differential    Microalbumin/Creatinine Ratio, Urine    Hepatitis C Antibody    Abnormal blood chemistry        Most recent comprehensive metabolic panel did show one or more abnormal values, requiring further testing for investigatory purposes.    Relevant Orders    CBC Auto Differential               Current Outpatient Medications   Medication Instructions    atorvastatin (LIPITOR) 20 mg, Oral, Daily    gabapentin (NEURONTIN) 300 mg, Oral, 3 times daily    glipiZIDE (GLUCOTROL) 10 mg, Oral, With  breakfast    LIDOcaine (LIDODERM) 5 % 1 patch remove after 12 hours    lisinopriL (PRINIVIL,ZESTRIL) 20 mg, Oral, Daily    LORazepam (ATIVAN) 0.5 mg, Oral, Every 6 hours PRN    meclizine (ANTIVERT) 25 mg, Oral, 3 times daily PRN    metFORMIN (GLUCOPHAGE) 1,000 mg, Oral, 2 times daily with meals    metoprolol succinate (TOPROL-XL) 100 mg, Oral, Daily    montelukast (SINGULAIR) 10 mg, Oral, Daily    venlafaxine (EFFEXOR-XR) 150 mg, Oral, Daily     Plan:             Follow up in about 14 weeks (around 9/28/2023).

## 2023-07-07 LAB — NONINV COLON CA DNA+OCC BLD SCRN STL QL: NEGATIVE

## 2023-08-06 ENCOUNTER — OFFICE VISIT (OUTPATIENT)
Dept: URGENT CARE | Facility: CLINIC | Age: 68
End: 2023-08-06
Payer: MEDICARE

## 2023-08-06 VITALS
WEIGHT: 238 LBS | HEIGHT: 66 IN | DIASTOLIC BLOOD PRESSURE: 78 MMHG | RESPIRATION RATE: 18 BRPM | OXYGEN SATURATION: 97 % | TEMPERATURE: 98 F | BODY MASS INDEX: 38.25 KG/M2 | SYSTOLIC BLOOD PRESSURE: 112 MMHG | HEART RATE: 80 BPM

## 2023-08-06 DIAGNOSIS — E11.9 TYPE 2 DIABETES MELLITUS WITHOUT COMPLICATION, WITH LONG-TERM CURRENT USE OF INSULIN: ICD-10-CM

## 2023-08-06 DIAGNOSIS — Z79.4 TYPE 2 DIABETES MELLITUS WITHOUT COMPLICATION, WITH LONG-TERM CURRENT USE OF INSULIN: ICD-10-CM

## 2023-08-06 DIAGNOSIS — M25.511 ACUTE PAIN OF RIGHT SHOULDER: ICD-10-CM

## 2023-08-06 DIAGNOSIS — M62.838 MUSCLE SPASM OF RIGHT SHOULDER: Primary | ICD-10-CM

## 2023-08-06 LAB — GLUCOSE SERPL-MCNC: 182 MG/DL (ref 70–110)

## 2023-08-06 PROCEDURE — 99213 PR OFFICE/OUTPT VISIT, EST, LEVL III, 20-29 MIN: ICD-10-PCS | Mod: ,,,

## 2023-08-06 PROCEDURE — 99213 OFFICE O/P EST LOW 20 MIN: CPT | Mod: ,,,

## 2023-08-06 PROCEDURE — 82962 POCT GLUCOSE, HAND-HELD DEVICE: ICD-10-PCS | Mod: ,,,

## 2023-08-06 PROCEDURE — 82962 GLUCOSE BLOOD TEST: CPT | Mod: ,,,

## 2023-08-06 RX ORDER — OMEPRAZOLE 20 MG/1
20 CAPSULE, DELAYED RELEASE ORAL DAILY
Qty: 14 CAPSULE | Refills: 0 | Status: SHIPPED | OUTPATIENT
Start: 2023-08-06 | End: 2023-08-20

## 2023-08-06 RX ORDER — NABUMETONE 500 MG/1
500 TABLET, FILM COATED ORAL 2 TIMES DAILY PRN
Qty: 20 TABLET | Refills: 0 | Status: SHIPPED | OUTPATIENT
Start: 2023-08-06 | End: 2023-08-11

## 2023-08-06 RX ORDER — CYCLOBENZAPRINE HCL 10 MG
5-10 TABLET ORAL 3 TIMES DAILY PRN
Qty: 15 TABLET | Refills: 0 | Status: SHIPPED | OUTPATIENT
Start: 2023-08-06 | End: 2023-08-11

## 2023-08-06 NOTE — PATIENT INSTRUCTIONS
Take half of a pill of Flexeril as it may make you drowsy.  Take medications only as prescribed as they may cause sedation (safety precautions given).   Drink plenty of fluids and get plenty of rest.  Take medication with food.  Prescription Prilosec called in due to prescription anti-inflammatory.  Do not take ibuprofen, Aleve, Advil over-the-counter while taking prescription NSAID.  Stretches daily.  Avoid strenuous lifting, use proper body mechanics.  Apply heating pad, Ice pack, Biofreeze or Epsom salt baths as needed.    Follow-up with your primary care doctor as needed.   Present to the Emergency Department with any significant change or worsening symptoms.    Strict ER precautions for persistent shoulder pain or upper back pain that is not reproducible with movement or palpation,  chest pain, shortness breath, development of fever and cough.

## 2023-08-06 NOTE — PROGRESS NOTES
"Subjective:      Patient ID: Lola Arana is a 68 y.o. female.    Vitals:  height is 5' 6" (1.676 m) and weight is 108 kg (238 lb). Her temperature is 98 °F (36.7 °C). Her blood pressure is 112/78 and her pulse is 80. Her respiration is 18 and oxygen saturation is 97%.     Chief Complaint: Shoulder Pain and Edema     Patient is a 68 y.o. female who presents to urgent care with complaints of to the right upper back, shoulder blade that has been present for the last week.  Patient reports pain is reproducible with movement and radiates to her right upper arm.  Patient reports history of muscle spasms on the right side of her neck and shoulder with history of herniated disc and cervical spine pain at baseline.  Patient denies any known injury or trauma to the shoulder.  Patient denies any chest pain, shortness O breath, fever, cough, congestion, body aches, chills, neck stiffness rash, GI symptoms.  Patient denies any numbness, tingling, weakness to the extremity.  Alleviating factors include warm compresses, ibuprofen, Lidocaine patches, and topical Voltaren with mild amount of relief.  Pain is worse with movements, palpation, lying on her shoulder arm in bed at night.  Patient reports she does work with her right arm often.  She is diabetic and last A1c was 8.7, was placed on glipizide at that time.  Patient reports pain feels "muscular and nerve pain related"      Shoulder Pain     Edema    ROS   Objective:     Physical Exam   Constitutional: She is oriented to person, place, and time. She appears well-developed. She is cooperative. No distress.   HENT:   Head: Normocephalic and atraumatic.   Nose: Nose normal.   Mouth/Throat: Oropharynx is clear and moist and mucous membranes are normal.   Eyes: Conjunctivae and lids are normal.   Neck: Trachea normal and phonation normal. Neck supple. No neck rigidity present. No decreased range of motion present.   Cardiovascular: Normal rate, regular rhythm, normal heart " sounds and normal pulses.   Pulmonary/Chest: Effort normal and breath sounds normal. No respiratory distress. She has no wheezes. She has no rales. She exhibits no tenderness.   Abdominal: Normal appearance and bowel sounds are normal. She exhibits no mass. Soft.   Musculoskeletal:         General: Tenderness present. No deformity.      Left shoulder: She exhibits tenderness. She exhibits normal range of motion, no bony tenderness, normal pulse and normal strength.      Cervical back: She exhibits no tenderness.      Thoracic back: She exhibits spasm. She exhibits normal range of motion and no bony tenderness.      Comments: Hypertonicity and Spasms to the right upper thoracic, trapezius area.  TTP to the right trapezius   Neurological: no focal deficit. She is alert, oriented to person, place, and time and at baseline. She has normal strength and normal reflexes. She displays no weakness. No sensory deficit. She shows no pronator drift. Coordination normal.   Skin: Skin is warm, dry, intact and not diaphoretic.   Psychiatric: Her speech is normal and behavior is normal. Judgment and thought content normal.   Nursing note and vitals reviewed.      Assessment:     1. Muscle spasm of right shoulder    2. Acute pain of right shoulder    3. Type 2 diabetes mellitus without complication, with long-term current use of insulin        Plan:       Muscle spasm of right shoulder  -     cyclobenzaprine (FLEXERIL) 10 MG tablet; Take 0.5-1 tablets (5-10 mg total) by mouth 3 (three) times daily as needed for Muscle spasms.  Dispense: 15 tablet; Refill: 0    Acute pain of right shoulder  -     nabumetone (RELAFEN) 500 MG tablet; Take 1 tablet (500 mg total) by mouth 2 (two) times daily as needed for Pain.  Dispense: 20 tablet; Refill: 0  -     omeprazole (PRILOSEC) 20 MG capsule; Take 1 capsule (20 mg total) by mouth once daily. for 14 days  Dispense: 14 capsule; Refill: 0    Type 2 diabetes mellitus without complication, with  long-term current use of insulin  -     POCT Glucose, Hand-Held Device                  CBG in clinic 182 will avoid steroids.  Discussed use of anti-inflammatories and muscle relaxers.  Based on patient's history of reflux following anti-inflammatories will prescribe Prilosec    Offered x-ray or EKG in clinic patient declines as pain is all reproducible and radiates down the right upper arm as nerve pain, discussed other causes of right upper back, shoulder pain such as pulmonary, pulmonary embolism, or cardiac etiology patient given strict ER precautions for right upper back, shoulder blade pain that is not reproducible, cough, fever, shortness of breath, chest pain.    Take medications only as prescribed as they may cause sedation (safety precautions given).   Drink plenty of fluids and get plenty of rest.  Take medication with food.   Lower back stretches daily.  Avoid strenuous lifting, use proper body mechanics.  Apply heating pad, Ice pack, Biofreeze or Epsom salt baths as needed.  Encourage exercise to strengthen core muscles to support your back.  Follow-up with your primary care doctor as needed.   Present to the Emergency Department with any significant change or worsening symptoms.

## 2023-08-10 ENCOUNTER — TELEPHONE (OUTPATIENT)
Dept: NEUROSURGERY | Facility: CLINIC | Age: 68
End: 2023-08-10
Payer: MEDICARE

## 2023-08-10 DIAGNOSIS — M47.22 CERVICAL SPONDYLOSIS WITH RADICULOPATHY: Primary | ICD-10-CM

## 2023-08-10 NOTE — TELEPHONE ENCOUNTER
"The patient states her symptoms started around 7/27/23 and it felt like a "crick" inside the shoulder blade area. She tried to relieve the crick by pushing up against the wall with a ball to that area as well as applying lidocaine 5% patches to this area but it did not help.  This pain progressed to the entire right posterior shoulder/back down to the elbow. The pt states the right side of her back is swollen in comparison to her left side.  The patient also states that lying on her right/left side causes a dull pain that progresses to severe nerve type pain that is not relieved even by getting out of bed. She has to lay on her back w/ 2 pillows and a heating pad. On 8/9/23 she noticed slight parasthesis to her right inner arm between the wrist and elbow. Most of the time she can do desk work or drive without much discomfort but usually the pain is dull, nerve-like and constant. She is taking Cyclobenzaprine and Nabumetone but it does not provide her with relief. Please advise.  "

## 2023-08-11 ENCOUNTER — HOSPITAL ENCOUNTER (OUTPATIENT)
Dept: RADIOLOGY | Facility: HOSPITAL | Age: 68
Discharge: HOME | End: 2023-08-11
Attending: PHYSICIAN ASSISTANT
Payer: MEDICARE

## 2023-08-11 ENCOUNTER — OFFICE VISIT (OUTPATIENT)
Dept: NEUROSURGERY | Facility: CLINIC | Age: 68
End: 2023-08-11
Payer: MEDICARE

## 2023-08-11 VITALS
DIASTOLIC BLOOD PRESSURE: 74 MMHG | SYSTOLIC BLOOD PRESSURE: 138 MMHG | BODY MASS INDEX: 37.28 KG/M2 | RESPIRATION RATE: 16 BRPM | HEIGHT: 66 IN | WEIGHT: 232 LBS | HEART RATE: 86 BPM

## 2023-08-11 DIAGNOSIS — M47.22 CERVICAL SPONDYLOSIS WITH RADICULOPATHY: ICD-10-CM

## 2023-08-11 DIAGNOSIS — M47.22 CERVICAL SPONDYLOSIS WITH RADICULOPATHY: Primary | ICD-10-CM

## 2023-08-11 PROCEDURE — 99213 PR OFFICE/OUTPT VISIT, EST, LEVL III, 20-29 MIN: ICD-10-PCS | Mod: ,,, | Performed by: PHYSICIAN ASSISTANT

## 2023-08-11 PROCEDURE — 72052 X-RAY EXAM NECK SPINE 6/>VWS: CPT | Mod: TC

## 2023-08-11 PROCEDURE — 99213 OFFICE O/P EST LOW 20 MIN: CPT | Mod: ,,, | Performed by: PHYSICIAN ASSISTANT

## 2023-08-11 RX ORDER — GABAPENTIN 100 MG/1
100 CAPSULE ORAL 3 TIMES DAILY
Qty: 90 CAPSULE | Refills: 11 | Status: SHIPPED | OUTPATIENT
Start: 2023-08-11 | End: 2023-10-23

## 2023-08-11 NOTE — PROGRESS NOTES
[facility]  History & Physical  Neurosurgery      Lola Arana   [id number]   1955       SUBJECTIVE:     CHIEF COMPLAINT:  Pain at the right periscapular and interscapular region.      HPI:  Lola Arana is a 68 y.o. female who presents for neurosurgical evaluation.  Two weeks ago, the patient had insidious onset of pain in the right periscapular and interscapular region.  She is not experiencing neck pain.  Her pain is worse when lying down.  Changing positions and use of heat has helped.  She has tried muscle relaxers and anti-inflammatories which did not provide much relief.  Her pain is progressively worsening.  She has been able to continue to work.  She has refrain from performing chores secondary to her symptoms.  There is numbness along the right volar forearm.  Subjectively, she does not feel she has weakness in either upper extremity.  She is experiencing trouble sleeping at night.  The pain is most severe when she gets up in the morning.  She has been seen at an urgent care clinic where she was given the muscle relaxer and anti-inflammatory.  She is had difficulties with her balance for a few years.  She denies disturbances in bowel or bladder function.    Her history is significant for having undergone right carpal tunnel release in  and left carpal tunnel release with Dr. Gleason on 2021.  She underwent C5-6 and C6-7 ACDF with Dr. Clemens on 10/16/2009.      Past Medical History:   Diagnosis Date    Anxiety disorder, unspecified     Arthritis     Diabetes mellitus, type 2     Fractures     HLD (hyperlipidemia)     Hypertension     Palpitations     Respiratory distress        Past Surgical History:   Procedure Laterality Date    ANTERIOR CERVICAL DISCECTOMY W/ FUSION  10/16/2009    C5-6, C6-7 ACDF.  Dr. Clemens    CARPAL TUNNEL RELEASE Left 2021    Dr. Barone    CARPAL TUNNEL RELEASE Right      SECTION      CHOLECYSTECTOMY  1999    FOOT SURGERY       HERNIA REPAIR      JOINT REPLACEMENT  Feb. 2009    Right TK    KNEE ARTHROSCOPY      KNEE SURGERY      LAPAROSCOPIC GASTRIC BANDING      PHACOEMULSIFICATION, CATARACT, WITH IOL INSERTION Right 04/28/2023    Procedure: PHACOEMULSIFICATION, CATARACT, WITH IOL INSERTION, EYHANCE- OD;  Surgeon: Edward Quiroz MD;  Location: [facility] OR;  Service: Ophthalmology;  Laterality: Right;    PHACOEMULSIFICATION, CATARACT, WITH IOL INSERTION Left 05/26/2023    Procedure: PHACOEMULSIFICATION, CATARACT, WITH IOL INSERTION- OS;  Surgeon: Edward Quiroz MD;  Location: [facility] OR;  Service: Ophthalmology;  Laterality: Left;       Family History   Problem Relation Age of Onset    Arthritis Mother     Cancer Mother         Breast and cervical. 80y/o    Hypertension Mother     COPD Father        Social History     Socioeconomic History    Marital status:    Tobacco Use    Smoking status: Never     Passive exposure: Never    Smokeless tobacco: Never    Tobacco comments:     Never   Substance and Sexual Activity    Alcohol use: Not Currently    Drug use: Never    Sexual activity: Not Currently     Partners: Male     Birth control/protection: None        Review of patient's allergies indicates:  No Known Allergies     Current Outpatient Medications   Medication Instructions    atorvastatin (LIPITOR) 20 mg, Oral, Daily    gabapentin (NEURONTIN) 100 mg, Oral, 3 times daily    glipiZIDE (GLUCOTROL) 10 mg, Oral, With breakfast    LIDOcaine (LIDODERM) 5 % 1 patch remove after 12 hours    lisinopriL (PRINIVIL,ZESTRIL) 20 mg, Oral, Daily    LORazepam (ATIVAN) 0.5 mg, Oral, Every 6 hours PRN    meclizine (ANTIVERT) 25 mg, Oral, 3 times daily PRN    metFORMIN (GLUCOPHAGE) 1,000 mg, Oral, 2 times daily with meals    metoprolol succinate (TOPROL-XL) 100 mg, Oral, Daily    montelukast (SINGULAIR) 10 mg, Oral, Daily    omeprazole (PRILOSEC) 20 mg, Oral, Daily    venlafaxine (EFFEXOR-XR) 150 mg, Oral, Daily        Review of Systems:    Review of  "Systems   Constitutional:  Negative for chills and fever.   HENT:  Negative for nosebleeds and sore throat.    Eyes:  Negative for pain and visual disturbance.   Respiratory:  Negative for cough, chest tightness and shortness of breath.    Cardiovascular:  Negative for chest pain.   Gastrointestinal:  Negative for diarrhea, nausea and vomiting.   Genitourinary:  Negative for difficulty urinating, dysuria and hematuria.   Musculoskeletal:  Positive for neck stiffness. Negative for gait problem and myalgias.   Skin:  Negative for rash.   Neurological:  Positive for weakness and numbness. Negative for dizziness, facial asymmetry and headaches.   Psychiatric/Behavioral:  Negative for confusion and sleep disturbance. The patient is not nervous/anxious.        OBJECTIVE:       Visit Vitals  /74   Pulse 86   Resp 16   Ht 5' 6" (1.676 m)   Wt 105.2 kg (232 lb)   BMI 37.45 kg/m²        General:  Pleasant, Well-nourished, Well-groomed.    CV:  Neck is supple.  There are no carotid bruits.  Heart has regular rate and rhythm.    Lungs:  Lungs are clear to auscultation bilaterally with non-labored respirations.    Abdomen:  Soft, non-tender, non-distended.    Musculoskeletal:  Cervical ROM:  Severely limited with extension and bilateral rotation.  Pain upper back is increased with extension and bilateral rotation.  Tinel's is negative at bilateral wrist and elbows.  Spurling's maneuver is positive on the right causing pain into the scapula,  negative on the left.    Neurological:    Alert and oriented to person, place, time, and situation.  Muscle strength against resistance:  Strength  Deltoids Triceps Biceps Wrist Extension Wrist Flexion Intrinsics   Upper: R 5/5 5/5 5/5 5/5  5/5    L 5/5 5/5 5/5 5/5  5/5   Sensation is intact to primary modalities in bilateral upper extremities.  Reflexes:   Right Left   Triceps 2+ 2+   Biceps 2+ 2+   Brachioradialis 2+ 2+   Morrison's sign is negative bilaterally.  Gait is normal. "   Coordination is normal.      Imaging:  Cervical x-rays were obtained today, 08/11/2023.  This study shows solid fusion at C5-6 and C6-7.  There is significant facet arthropathy on the right the mid to upper cervical spine.  With flexion there is slight anterolisthesis of C4 on C5.      ASSESSMENT:     1. Cervical spondylosis with radiculopathy  gabapentin (NEURONTIN) 100 MG capsule    Ambulatory referral/consult to Physical/Occupational Therapy         PLAN:     Options were discussed at length with the patient.  She was provided with a prescription for gabapentin.  She will undergo a course of physical therapy.  We discussed home cervical traction.  She was provided information regarding instruction on use.  She will start using her 10s unit.  We will have her return for follow-up in 6 weeks.  If her symptoms have not improved with therapy, we will have her obtain cervical MRI prior to that visit.  She voiced understanding.      A total of 24 minutes was spent face-to-face with the patient during this encounter.  Over half of that time was spent on counseling and coordination of care.       Demi Muñoz PA-C      Disclaimer:  This note is prepared using voice recognition software and as such is likely to have errors despite attempts at proofreading.

## 2023-09-07 ENCOUNTER — PATIENT MESSAGE (OUTPATIENT)
Dept: RESEARCH | Facility: HOSPITAL | Age: 68
End: 2023-09-07
Payer: MEDICARE

## 2023-09-22 ENCOUNTER — TELEPHONE (OUTPATIENT)
Dept: PRIMARY CARE CLINIC | Facility: CLINIC | Age: 68
End: 2023-09-22
Payer: MEDICARE

## 2023-09-22 ENCOUNTER — PATIENT MESSAGE (OUTPATIENT)
Dept: PRIMARY CARE CLINIC | Facility: CLINIC | Age: 68
End: 2023-09-22
Payer: MEDICARE

## 2023-09-22 NOTE — TELEPHONE ENCOUNTER
----- Message from Mary Arauz LPN sent at 9/22/2023  9:07 AM CDT -----  Regarding: FW: appt    ----- Message -----  From: Taisha Ramos  Sent: 9/22/2023   9:01 AM CDT  To: [facility] Staff  Subject: appt                                             .Type:  Needs Medical Advice    Who Called: pt  Would the patient rather a call back or a response via [facility]?   Best Call Back Number: [phone]  Additional Information: pt needs to r/s appt 9/28- next sean. 11/28 = pt request call back

## 2023-09-25 ENCOUNTER — PATIENT MESSAGE (OUTPATIENT)
Dept: PRIMARY CARE CLINIC | Facility: CLINIC | Age: 68
End: 2023-09-25
Payer: MEDICARE

## 2023-09-25 ENCOUNTER — OFFICE VISIT (OUTPATIENT)
Dept: NEUROSURGERY | Facility: CLINIC | Age: 68
End: 2023-09-25
Payer: MEDICARE

## 2023-09-25 VITALS
RESPIRATION RATE: 16 BRPM | BODY MASS INDEX: 38.89 KG/M2 | WEIGHT: 242 LBS | DIASTOLIC BLOOD PRESSURE: 83 MMHG | HEART RATE: 74 BPM | SYSTOLIC BLOOD PRESSURE: 137 MMHG | HEIGHT: 66 IN

## 2023-09-25 DIAGNOSIS — G56.02 CARPAL TUNNEL SYNDROME ON LEFT: ICD-10-CM

## 2023-09-25 DIAGNOSIS — M47.22 CERVICAL SPONDYLOSIS WITH RADICULOPATHY: Primary | ICD-10-CM

## 2023-09-25 PROCEDURE — 99212 PR OFFICE/OUTPT VISIT, EST, LEVL II, 10-19 MIN: ICD-10-PCS | Mod: ,,, | Performed by: PHYSICIAN ASSISTANT

## 2023-09-25 PROCEDURE — 99212 OFFICE O/P EST SF 10 MIN: CPT | Mod: ,,, | Performed by: PHYSICIAN ASSISTANT

## 2023-09-25 NOTE — PROGRESS NOTES
[facility]  History & Physical  Neurosurgery      Lola Arana   [id number]   1955       SUBJECTIVE:     CHIEF COMPLAINT:  Neck pain that has improved      HPI:  Lola Arana is a 68 y.o. female who presents for follow up appointment after a course of physical therapy.  After she was seen on 2023, she experienced an increase in pain with pain and tingling radiating through the right arm and into her hand.  She began physical therapy two weeks after she was seen in our office.  With the therapy, all of her symptoms have improved.  She is doing cervical traction at home.  She denies weakness.  There is no radicular pain.  She rates her neck pain at 1/10 today.      Past Medical History:   Diagnosis Date    Anxiety disorder, unspecified     Arthritis     Diabetes mellitus, type 2     Fractures     HLD (hyperlipidemia)     Hypertension     Palpitations     Respiratory distress        Past Surgical History:   Procedure Laterality Date    ANTERIOR CERVICAL DISCECTOMY W/ FUSION  10/16/2009    C5-6, C6-7 ACDF.  Dr. Clemens    CARPAL TUNNEL RELEASE Left 2021    Dr. Barone    CARPAL TUNNEL RELEASE Right      SECTION      CHOLECYSTECTOMY  1999    FOOT SURGERY      HERNIA REPAIR      JOINT REPLACEMENT  2009    Right TK    KNEE ARTHROSCOPY      KNEE SURGERY      LAPAROSCOPIC GASTRIC BANDING      PHACOEMULSIFICATION, CATARACT, WITH IOL INSERTION Right 2023    Procedure: PHACOEMULSIFICATION, CATARACT, WITH IOL INSERTION, EYHANCE- OD;  Surgeon: Edward Quiroz MD;  Location: [facility] OR;  Service: Ophthalmology;  Laterality: Right;    PHACOEMULSIFICATION, CATARACT, WITH IOL INSERTION Left 2023    Procedure: PHACOEMULSIFICATION, CATARACT, WITH IOL INSERTION- OS;  Surgeon: Edward Quiroz MD;  Location: [facility] OR;  Service: Ophthalmology;  Laterality: Left;       Family History   Problem Relation Age of Onset    Arthritis Mother     Cancer Mother         Breast and  "cervical. 80y/o    Hypertension Mother     COPD Father        Social History     Socioeconomic History    Marital status:    Tobacco Use    Smoking status: Never     Passive exposure: Never    Smokeless tobacco: Never    Tobacco comments:     Never   Substance and Sexual Activity    Alcohol use: Not Currently    Drug use: Never    Sexual activity: Not Currently     Partners: Male     Birth control/protection: None       Review of patient's allergies indicates:  No Known Allergies     Current Outpatient Medications   Medication Instructions    atorvastatin (LIPITOR) 20 mg, Oral, Daily    gabapentin (NEURONTIN) 100 mg, Oral, 3 times daily    glipiZIDE (GLUCOTROL) 10 mg, Oral, With breakfast    lisinopriL (PRINIVIL,ZESTRIL) 20 mg, Oral, Daily    LORazepam (ATIVAN) 0.5 mg, Oral, Every 6 hours PRN    meclizine (ANTIVERT) 25 mg, Oral, 3 times daily PRN    metFORMIN (GLUCOPHAGE) 1,000 mg, Oral, 2 times daily with meals    metoprolol succinate (TOPROL-XL) 100 mg, Oral, Daily    montelukast (SINGULAIR) 10 mg, Oral, Daily    omeprazole (PRILOSEC) 20 mg, Oral, Daily    venlafaxine (EFFEXOR-XR) 150 mg, Oral, Daily        Review of Systems   Constitutional:  Negative for chills and fever.   HENT:  Negative for nosebleeds and sore throat.    Eyes:  Negative for pain and visual disturbance.   Respiratory:  Negative for cough, chest tightness and shortness of breath.    Cardiovascular:  Negative for chest pain.   Gastrointestinal:  Negative for diarrhea, nausea and vomiting.   Genitourinary:  Negative for difficulty urinating, dysuria and hematuria.   Musculoskeletal:  Positive for neck pain. Negative for gait problem and myalgias.   Skin:  Negative for rash.   Neurological:  Negative for dizziness, facial asymmetry and headaches.   Psychiatric/Behavioral:  Negative for confusion and sleep disturbance. The patient is not nervous/anxious.        OBJECTIVE:       Visit Vitals  /83   Pulse 74   Resp 16   Ht 5' 6" (1.676 " m)   Wt 109.8 kg (242 lb)   BMI 39.06 kg/m²        General:  Pleasant, Well-groomed, Overweight.    Lungs:  Breathing is quiet with non-labored respirations.    Neurological:    Alert and oriented to person, place, time, and situation.  Her memory, cognition, and affect are appropriate.  Speech is fluent.  She is moving all extremities well.  Gait is normal      ASSESSMENT:     1. Cervical spondylosis with radiculopathy        2. Carpal tunnel syndrome on left           PLAN:     Overall, the patient has seen great improvement in her symptoms.  We discussed continuing with home exercises and traction when needed.  She voiced understanding.  She will return for follow-up on an as-needed basis.    A total of 17 minutes was spent face-to-face with the patient during this encounter.  Over half of that time was spent on counseling and coordination of care.       Demi Muñoz PA-C      Disclaimer:  This note is prepared using voice recognition software and as such is likely to have errors despite attempts at proofreading.

## 2023-10-11 ENCOUNTER — CLINICAL SUPPORT (OUTPATIENT)
Dept: PRIMARY CARE CLINIC | Facility: CLINIC | Age: 68
End: 2023-10-11
Payer: MEDICARE

## 2023-10-11 DIAGNOSIS — E11.65 TYPE 2 DIABETES MELLITUS WITH HYPERGLYCEMIA, WITHOUT LONG-TERM CURRENT USE OF INSULIN: Primary | Chronic | ICD-10-CM

## 2023-10-11 DIAGNOSIS — R79.9 ABNORMAL BLOOD CHEMISTRY: ICD-10-CM

## 2023-10-11 DIAGNOSIS — E11.65 TYPE 2 DIABETES MELLITUS WITH HYPERGLYCEMIA, WITHOUT LONG-TERM CURRENT USE OF INSULIN: Chronic | ICD-10-CM

## 2023-10-11 DIAGNOSIS — I10 PRIMARY HYPERTENSION: Chronic | ICD-10-CM

## 2023-10-11 DIAGNOSIS — E78.5 HYPERLIPIDEMIA, UNSPECIFIED HYPERLIPIDEMIA TYPE: ICD-10-CM

## 2023-10-11 DIAGNOSIS — Z00.00 WELLNESS EXAMINATION: ICD-10-CM

## 2023-10-11 DIAGNOSIS — E78.5 DYSLIPIDEMIA: Chronic | ICD-10-CM

## 2023-10-11 LAB
ALBUMIN SERPL-MCNC: 4.2 G/DL (ref 3.4–4.8)
ALBUMIN/GLOB SERPL: 1.6 RATIO (ref 1.1–2)
ALP SERPL-CCNC: 75 UNIT/L (ref 40–150)
ALT SERPL-CCNC: 22 UNIT/L (ref 0–55)
AST SERPL-CCNC: 14 UNIT/L (ref 5–34)
BASOPHILS # BLD AUTO: 0.05 X10(3)/MCL
BASOPHILS NFR BLD AUTO: 0.6 %
BILIRUB SERPL-MCNC: 0.6 MG/DL
BUN SERPL-MCNC: 16.5 MG/DL (ref 9.8–20.1)
CALCIUM SERPL-MCNC: 9.7 MG/DL (ref 8.4–10.2)
CHLORIDE SERPL-SCNC: 107 MMOL/L (ref 98–107)
CHOLEST SERPL-MCNC: 138 MG/DL
CHOLEST/HDLC SERPL: 3 {RATIO} (ref 0–5)
CO2 SERPL-SCNC: 27 MMOL/L (ref 23–31)
CREAT SERPL-MCNC: 0.84 MG/DL (ref 0.55–1.02)
EOSINOPHIL # BLD AUTO: 0.19 X10(3)/MCL (ref 0–0.9)
EOSINOPHIL NFR BLD AUTO: 2.3 %
ERYTHROCYTE [DISTWIDTH] IN BLOOD BY AUTOMATED COUNT: 14.2 % (ref 11.5–17)
EST. AVERAGE GLUCOSE BLD GHB EST-MCNC: 162.8 MG/DL
GFR SERPLBLD CREATININE-BSD FMLA CKD-EPI: >60 MLS/MIN/1.73/M2
GLOBULIN SER-MCNC: 2.6 GM/DL (ref 2.4–3.5)
GLUCOSE SERPL-MCNC: 192 MG/DL (ref 82–115)
HBA1C MFR BLD: 7.3 %
HCT VFR BLD AUTO: 40.7 % (ref 37–47)
HDLC SERPL-MCNC: 47 MG/DL (ref 35–60)
HGB BLD-MCNC: 13.7 G/DL (ref 12–16)
IMM GRANULOCYTES # BLD AUTO: 0.07 X10(3)/MCL (ref 0–0.04)
IMM GRANULOCYTES NFR BLD AUTO: 0.8 %
LDLC SERPL CALC-MCNC: 62 MG/DL (ref 50–140)
LYMPHOCYTES # BLD AUTO: 2.71 X10(3)/MCL (ref 0.6–4.6)
LYMPHOCYTES NFR BLD AUTO: 32.8 %
MCH RBC QN AUTO: 30.4 PG (ref 27–31)
MCHC RBC AUTO-ENTMCNC: 33.7 G/DL (ref 33–36)
MCV RBC AUTO: 90.4 FL (ref 80–94)
MONOCYTES # BLD AUTO: 0.62 X10(3)/MCL (ref 0.1–1.3)
MONOCYTES NFR BLD AUTO: 7.5 %
NEUTROPHILS # BLD AUTO: 4.62 X10(3)/MCL (ref 2.1–9.2)
NEUTROPHILS NFR BLD AUTO: 56 %
NRBC BLD AUTO-RTO: 0 %
PLATELET # BLD AUTO: 226 X10(3)/MCL (ref 130–400)
PMV BLD AUTO: 11.2 FL (ref 7.4–10.4)
POTASSIUM SERPL-SCNC: 4.1 MMOL/L (ref 3.5–5.1)
PROT SERPL-MCNC: 6.8 GM/DL (ref 5.8–7.6)
RBC # BLD AUTO: 4.5 X10(6)/MCL (ref 4.2–5.4)
SODIUM SERPL-SCNC: 143 MMOL/L (ref 136–145)
TRIGL SERPL-MCNC: 143 MG/DL (ref 37–140)
TSH SERPL-ACNC: 1.85 UIU/ML (ref 0.35–4.94)
VLDLC SERPL CALC-MCNC: 29 MG/DL
WBC # SPEC AUTO: 8.26 X10(3)/MCL (ref 4.5–11.5)

## 2023-10-11 PROCEDURE — 36415 COLL VENOUS BLD VENIPUNCTURE: CPT | Mod: ,,, | Performed by: GENERAL PRACTICE

## 2023-10-11 PROCEDURE — 86803 HEPATITIS C AB TEST: CPT | Performed by: GENERAL PRACTICE

## 2023-10-11 PROCEDURE — 36415 COLL VENOUS BLD VENIPUNCTURE: CPT

## 2023-10-11 PROCEDURE — 83036 HEMOGLOBIN GLYCOSYLATED A1C: CPT | Performed by: GENERAL PRACTICE

## 2023-10-11 PROCEDURE — 80053 COMPREHEN METABOLIC PANEL: CPT | Performed by: GENERAL PRACTICE

## 2023-10-11 PROCEDURE — 36415 PR COLLECTION VENOUS BLOOD,VENIPUNCTURE: ICD-10-PCS | Mod: ,,, | Performed by: GENERAL PRACTICE

## 2023-10-11 PROCEDURE — 85025 COMPLETE CBC W/AUTO DIFF WBC: CPT | Performed by: GENERAL PRACTICE

## 2023-10-11 PROCEDURE — 80061 LIPID PANEL: CPT | Performed by: GENERAL PRACTICE

## 2023-10-11 PROCEDURE — 84443 ASSAY THYROID STIM HORMONE: CPT | Performed by: GENERAL PRACTICE

## 2023-10-12 LAB — HCV AB SERPL QL IA: NONREACTIVE

## 2023-10-21 NOTE — ASSESSMENT & PLAN NOTE
Component Ref Range & Units 10 d ago 1 yr ago 2 yr ago   Cholesterol Total <=200 mg/dL 138  208 High   125 R    HDL Cholesterol 35 - 60 mg/dL 47  57  46 R    Triglyceride 37 - 140 mg/dL 143 High   156 High   144 R    Cholesterol/HDL Ratio 0 - 5 3  4     Very Low Density Lipoprotein  29  31     LDL Cholesterol 50.00 - 140.00 mg/dL 62.00  120.00       Most recent cholesterol/lipid blood testing shows adequate control, continue current treatment plan, including prescription medications if prescribed, and/or diet high in fiber, low in saturated fats as discussed during clinic visit.

## 2023-10-21 NOTE — ASSESSMENT & PLAN NOTE
Component Ref Range & Units 10 d ago 4 mo ago 8 mo ago 10 mo ago 1 yr ago   Hemoglobin A1c <=7.0 % 7.3 High   8.7 High   8.6 High   8.5 High   8.4 High     Estimated Average Glucose mg/dL 162.8  203.0  200.1  197.3  194.4      Current A1c scale slightly elevated at 7.3%, but this is much improved from her last for A1cs which were all over 8%.  Will stress lifestyle modification, and hopefully we can keep it at or below 7%.

## 2023-10-21 NOTE — PROGRESS NOTES
Patient ID: [id number]     Chief Complaint: Annual Exam      HPI:     Lola Arana is a 68 y.o. female here today for a Medicare Wellness. No other complaints today.       ----------------------------  Anxiety disorder, unspecified  Arthritis  Diabetes mellitus, type 2  Fractures  HLD (hyperlipidemia)  Hypertension  Palpitations  Respiratory distress     Past Surgical History:   Procedure Laterality Date    ANTERIOR CERVICAL DISCECTOMY W/ FUSION  10/16/2009    C5-6, C6-7 ACDF.  Dr. Clemens    CARPAL TUNNEL RELEASE Left 2021    Dr. Barone    CARPAL TUNNEL RELEASE Right      SECTION      CHOLECYSTECTOMY  1999    FOOT SURGERY      HERNIA REPAIR      JOINT REPLACEMENT  2009    Right TK    KNEE ARTHROSCOPY      KNEE SURGERY      LAPAROSCOPIC GASTRIC BANDING      PHACOEMULSIFICATION, CATARACT, WITH IOL INSERTION Right 2023    Procedure: PHACOEMULSIFICATION, CATARACT, WITH IOL INSERTION, EYHANCE- OD;  Surgeon: Edward Quiroz MD;  Location: [facility] OR;  Service: Ophthalmology;  Laterality: Right;    PHACOEMULSIFICATION, CATARACT, WITH IOL INSERTION Left 2023    Procedure: PHACOEMULSIFICATION, CATARACT, WITH IOL INSERTION- OS;  Surgeon: Edward Quiroz MD;  Location: [facility] OR;  Service: Ophthalmology;  Laterality: Left;       Review of patient's allergies indicates:  No Known Allergies    Outpatient Medications Marked as Taking for the 10/23/23 encounter (Office Visit) with Eder Combs MD   Medication Sig Dispense Refill    atorvastatin (LIPITOR) 20 MG tablet Take 1 tablet (20 mg total) by mouth once daily. 90 tablet 3    glipiZIDE (GLUCOTROL) 10 MG TR24 Take 1 tablet (10 mg total) by mouth daily with breakfast. 90 tablet 3    lisinopriL (PRINIVIL,ZESTRIL) 20 MG tablet Take 1 tablet (20 mg total) by mouth once daily. 90 tablet 3    meclizine (ANTIVERT) 25 mg tablet Take 1 tablet (25 mg total) by mouth 3 (three) times daily as needed for Dizziness. 30 tablet 5    metFORMIN  (GLUCOPHAGE) 1000 MG tablet Take 1 tablet (1,000 mg total) by mouth 2 (two) times daily with meals. 180 tablet 3    metoprolol succinate (TOPROL-XL) 100 MG 24 hr tablet Take 1 tablet (100 mg total) by mouth once daily. 90 tablet 3    montelukast (SINGULAIR) 10 mg tablet Take 1 tablet (10 mg total) by mouth once daily. 90 tablet 3    venlafaxine (EFFEXOR-XR) 150 MG Cp24 Take 1 capsule (150 mg total) by mouth once daily. 90 capsule 3       Social History     Socioeconomic History    Marital status:    Tobacco Use    Smoking status: Never     Passive exposure: Never    Smokeless tobacco: Never    Tobacco comments:     Never   Substance and Sexual Activity    Alcohol use: Not Currently    Drug use: Never    Sexual activity: Not Currently     Partners: Male     Birth control/protection: None        Family History   Problem Relation Age of Onset    Arthritis Mother     Cancer Mother         Breast and cervical. 80y/o    Hypertension Mother     COPD Father         Patient Care Team:  Eder Combs MD as PCP - General (Family Medicine)  Rogers Cline DPM as Consulting Physician (Podiatry)  Tara Felipe MD as Obstetrician (Obstetrics)  Elton Vega MD (Dermatology)  Edward Quiroz MD as Consulting Physician (Ophthalmology)  Yaakov Drew DPM as Consulting Physician (Podiatry)  Tom Barone MD as Consulting Physician (Neurosurgery)     Opioid Screening: Patient medication list reviewed, patient is not taking prescription opioids. Patient is not using additional opioids than prescribed. Patient is at low risk of substance abuse based on this opioid use history.       Subjective:     Review of Systems   Constitutional: Negative.  Negative for malaise/fatigue and weight loss.   HENT: Negative.     Eyes: Negative.    Respiratory: Negative.  Negative for shortness of breath.    Cardiovascular: Negative.  Negative for chest pain.   Gastrointestinal: Negative.    Genitourinary: Negative.     Musculoskeletal: Negative.    Skin: Negative.    Neurological: Negative.  Negative for focal weakness, weakness and headaches.   Endo/Heme/Allergies: Negative.    Psychiatric/Behavioral: Negative.  Negative for depression and memory loss. The patient is not nervous/anxious.          Patient Reported Health Risk Assessment  What [age] 65-69  Are you male or female?: Female  During the past four weeks, how much have you been bothered by emotional problems such as feeling anxious, depressed, irritable, sad, or downhearted and blue?: Not at all  During the past five weeks, has your physical and/or emotional health limited your social activities with family, friends, neighbors, or groups?: Not at all  During the past four weeks, how much bodily pain have you generally had?: No pain  During the past four weeks, was someone available to help if you needed and wanted help?: Yes, as much as I wanted  During the past four weeks, what was the hardest physical activity you could do for at least two minutes?: Light  Can you get to places out of walking distance without help?  (For example, can you travel alone on buses or taxis, or drive your own car?): Yes  Can you go shopping for groceries or clothes without someone's help?: Yes  Can you prepare your own meals?: Yes  Can you do your own housework without help?: Yes  Because of any health problems, do you need the help of another person with your personal care needs such as eating, bathing, dressing, or getting around the house?: No  Can you handle your own money without help?: Yes  During the past four weeks, how would you rate your health in general?: Good  How have things been going for you during the past four weeks?: Pretty well  Are you having difficulties driving your car?: No  Do you always fasten your seat belt when you are in a car?: Yes, usually  How often in the past four weeks have you been bothered by falling or dizzy when standing up?: Never  How often  "in the past four weeks have you been bothered by sexual problems?: Never  How often in the past four weeks have you been bothered by trouble eating well?: Never  How often in the past four weeks have you been bothered by teeth or denture problems?: Never  How often in the past four weeks have you been bothered with problems using the telephone?: Never  How often in the past four weeks have you been bothered by tiredness or fatigue?: Never  Have you fallen two or more times in the past year?: No  Are you afraid of falling?: No  Are you a smoker?: No  During the past four weeks, how many drinks of wine, beer, or other alcoholic beverages did you have?: One drink or less per week  Do you exercise for about 20 minutes three or more days a week?: No, I usually do not exercise this much  Have you been given any information to help you with hazards in your house that might hurt you?: No  Have you been given any information to help you with keeping track of your medications?: No  How often do you have trouble taking medicines the way you've been told to take them?: I always take them as prescribed  How confident are you that you can control and manage most of your health problems?: Very confident  What is your race? (Check all that apply.):     Objective:     /82   Pulse 88   Resp 18   Ht 5' 6" (1.676 m)   Wt 109.8 kg (242 lb)   SpO2 97%   BMI 39.06 kg/m²     Physical Exam  Constitutional:       Appearance: Normal appearance. She is obese.   HENT:      Head: Normocephalic.      Mouth/Throat:      Mouth: Mucous membranes are moist.      Pharynx: Oropharynx is clear.   Eyes:      Conjunctiva/sclera: Conjunctivae normal.      Pupils: Pupils are equal, round, and reactive to light.   Cardiovascular:      Rate and Rhythm: Normal rate and regular rhythm.      Heart sounds: Normal heart sounds.   Pulmonary:      Effort: Pulmonary effort is normal.      Breath sounds: Normal breath sounds.   Abdominal:      " General: Abdomen is flat.      Palpations: Abdomen is soft.   Musculoskeletal:         General: Normal range of motion.      Cervical back: Neck supple.   Skin:     General: Skin is warm and dry.   Neurological:      General: No focal deficit present.      Mental Status: She is alert and oriented to person, place, and time.   Psychiatric:         Mood and Affect: Mood normal.         Behavior: Behavior normal.         Thought Content: Thought content normal.         Judgment: Judgment normal.     Diabetic foot exam:  Examination performed by myself, Dr. Combs    Examination of the skin/nails:  Bilateral examination of the feet reveals no significant skin lesions/abnormalities, ulcerations, ulcers, nail abnormalities.  Patient does appear to be using appropriate footwear.    Peripheral pulses:  Dorsalis pedis pulse-normal bilaterally  Posterior tibialis pulse-normal bilaterally  Appropriate amount of foot hair, capillary refill normal    Five point sensation test:  Left foot sensation test-no significant loss of sensation at all points tested  Right foot to cessation test-no significant loss of sensation at all points tested    Foot exam risk category:  0-2 (none or minimal loss of protective sensation with no additional findings)    Foot exam intervention:  Foot care education done at appropriate risk level      Lab Results   Component Value Date     10/11/2023    K 4.1 10/11/2023    CO2 27 10/11/2023    BUN 16.5 10/11/2023    CREATININE 0.84 10/11/2023    CALCIUM 9.7 10/11/2023    ALBUMIN 4.2 10/11/2023    BILITOT 0.6 10/11/2023    ALKPHOS 75 10/11/2023    AST 14 10/11/2023    ALT 22 10/11/2023    EGFRNORACEVR >60 10/11/2023     Lab Results   Component Value Date    WBC 8.26 10/11/2023    RBC 4.50 10/11/2023    HGB 13.7 10/11/2023    HCT 40.7 10/11/2023    MCV 90.4 10/11/2023    RDW 14.2 10/11/2023     10/11/2023      Lab Results   Component Value Date    CHOL 138 10/11/2023    TRIG 143 (H) 10/11/2023     HDL 47 10/11/2023    LDL 62.00 10/11/2023    TOTALCHOLEST 3 10/11/2023     Lab Results   Component Value Date    TSH 1.853 10/11/2023     Lab Results   Component Value Date    HGBA1C 7.3 (H) 10/11/2023             No data to display                  10/23/2023     3:45 PM 9/25/2023     1:00 PM 8/11/2023    11:30 AM 3/16/2023    10:45 AM 11/30/2022     1:30 PM   Fall Risk Assessment - Outpatient   Mobility Status Ambulatory Ambulatory Ambulatory Ambulatory Ambulatory   Number of falls 0 0 0 0 0   Identified as fall risk False False False False False           Depression Screening  Over the past two weeks, has the patient felt down, depressed, or hopeless?: No  Over the past two weeks, has the patient felt little interest or pleasure in doing things?: No  Functional Ability/Safety Screening  Was the patient's timed Up & Go test unsteady or longer than 30 seconds?: No  Does the patient need help with phone, transportation, shopping, preparing meals, housework, laundry, meds, or managing money?: No  Does the patient's home have rugs in the hallway, lack grab bars in the bathroom, lack handrails on the stairs or have poor lighting?: No  Have you noticed any hearing difficulties?: No  Cognitive Function (Assessed through direct observation with due consideration of information obtained by way of patient reports and/or concerns raised by family, friends, caretakers, or others)    Does the patient repeat questions/statements in the same day?: No  Does the patient have trouble remembering the date, year, and time?: No  Does the patient have difficulty managing finances?: No  Does the patient have a decreased sense of direction?: No  Assessment:       ICD-10-CM ICD-9-CM   1. Medicare annual wellness visit, subsequent  Z00.00 V70.0   2. Primary hypertension  I10 401.9   3. Dyslipidemia  E78.5 272.4   4. Palpitations  R00.2 785.1   5. Type 2 diabetes mellitus with hyperglycemia, without long-term current use of insulin   E11.65 250.00     790.29   6. Generalized anxiety disorder  F41.1 300.02   7. Severe obesity (BMI 35.0-39.9) with comorbidity  E66.01 278.01        Plan:     Medicare Annual Wellness and Personalized Prevention Plan:   Fall Risk + Home Safety + Hearing Impairment + Depression Screen + Cognitive Impairment Screen + Health Risk Assessment all reviewed.       Health Maintenance Topics with due status: Not Due       Topic Last Completion Date    Colorectal Cancer Screening 06/29/2023    Low Dose Statin 09/29/2023    Lipid Panel 10/11/2023    Hemoglobin A1c 10/11/2023    Foot Exam 10/23/2023      The patient's Health Maintenance was reviewed and the following appears to be due at this time:   Health Maintenance Due   Topic Date Due    DEXA Scan  Never done    Diabetes Urine Screening  07/20/2023    Influenza Vaccine (1) 09/01/2023    Mammogram  11/03/2023    Eye Exam  11/14/2023         1. Medicare annual wellness visit, subsequent  Comments:  Overall health status was reviewed.  Good health habits reinforced.  Annual wellness exams recommended.    2. Primary hypertension  Overview:  Prescribed lisinopril 20 mg daily, Toprol- mg daily.    Assessment & Plan:  Blood pressures appear to be adequately controlled with current treatment plan, including prescription blood pressure medication. Continue medical management and continue home blood pressure checks.  Blood pressure should be checked as discussed during medical visits, and average blood pressure should be no greater than 130/80.      3. Dyslipidemia  Overview:  Prescribed Lipitor 20 mg daily.    Assessment & Plan:  Component Ref Range & Units 10 d ago 1 yr ago 2 yr ago   Cholesterol Total <=200 mg/dL 138  208 High   125 R    HDL Cholesterol 35 - 60 mg/dL 47  57  46 R    Triglyceride 37 - 140 mg/dL 143 High   156 High   144 R    Cholesterol/HDL Ratio 0 - 5 3  4     Very Low Density Lipoprotein  29  31     LDL Cholesterol 50.00 - 140.00 mg/dL 62.00  120.00        Most recent cholesterol/lipid blood testing shows adequate control, continue current treatment plan, including prescription medications if prescribed, and/or diet high in fiber, low in saturated fats as discussed during clinic visit.      4. Palpitations  Overview:  Prescribed Toprol  mg daily, effective with both blood pressure control and to control palpitations.      5. Type 2 diabetes mellitus with hyperglycemia, without long-term current use of insulin  Overview:  Motor one prescribed metformin 1000 mg b.i.d., glipizide XL 10 mg daily.    Assessment & Plan:  Component Ref Range & Units 10 d ago 4 mo ago 8 mo ago 10 mo ago 1 yr ago   Hemoglobin A1c <=7.0 % 7.3 High   8.7 High   8.6 High   8.5 High   8.4 High     Estimated Average Glucose mg/dL 162.8  203.0  200.1  197.3  194.4      Current A1c scale slightly elevated at 7.3%, but this is much improved from her last for A1cs which were all over 8%.  Will stress lifestyle modification, and hopefully we can keep it at or below 7%.    Orders:  -     Hemoglobin A1C; Future; Expected date: 04/23/2024  -     Microalbumin/Creatinine Ratio, Urine; Future; Expected date: 04/23/2024    6. Generalized anxiety disorder  Overview:  Prescribed lorazepam 0.5 mg q.6 hours. .  Fairly infrequent use, 30 tablets lasting between 3-5 months.    Assessment & Plan:  This medical condition is currently stable on prescription medication, continue current treatment plan.    Orders:  -     LORazepam (ATIVAN) 0.5 MG tablet; Take 1 tablet (0.5 mg total) by mouth every 6 (six) hours as needed for Anxiety.  Dispense: 30 tablet; Refill: 2    7. Severe obesity (BMI 35.0-39.9) with comorbidity  Assessment & Plan:  Weight loss, healthy dietary choices, increased activity/exercise are recommended.  To lose weight, it is generally recommended to restrict calories to approximately 1500 calories per day to lose 1 lb per week, and approximately 1200 calories per day to lose up to 2 lb per  "week.  Generally, this is a healthy amount of weight to lose, and an appropriate amount of time to lose this amount of weight.  Adding exercise will assist in losing weight, particularly aerobic exercise such as walking.  However, resistance training, or lifting weights" over time may assistant weight loss by increasing basal metabolic rate, or the rate at which your body burns calories during periods of inactivity.    Keep track of BMI (body mass index), below 25 is considered normal, over 25 but below 30 is considered overweight, anything over 30 is considered moderately obese, over 35 severely obese, and over 40 is characterized as morbidly obese.              Advance Care Planning     Date: 10/21/2023    Living Will  During this visit, I engaged the patient  in the voluntary advance care planning process.  The patient and I reviewed the role for advance directives and their purpose in directing future healthcare if the patient's unable to speak for him/herself.  At this point in time, the patient does have full decision-making capacity.  We discussed different extreme health states that she could experience, and reviewed what kind of medical care she would want in those situations.  The patient communicated that if she were comatose and had little chance of a meaningful recovery, she would not want machines/life-sustaining treatments used. In addition to the above preference, other important end-of-life issues for the patient include no other issues.  Patient does have a living will/advanced care planning, will bring a copy to the clinic so that we can place it in the chart.  Patient may also be given the option to complete our advanced care planning paperwork so that we may enter it into the medical record today.  I spent a total of 10 minutes engaging the patient in this advance care planning discussion.              Current Outpatient Medications   Medication Instructions    atorvastatin (LIPITOR) 20 mg, " Oral, Daily    glipiZIDE (GLUCOTROL) 10 mg, Oral, With breakfast    lisinopriL (PRINIVIL,ZESTRIL) 20 mg, Oral, Daily    LORazepam (ATIVAN) 0.5 mg, Oral, Every 6 hours PRN    meclizine (ANTIVERT) 25 mg, Oral, 3 times daily PRN    metFORMIN (GLUCOPHAGE) 1,000 mg, Oral, 2 times daily with meals    metoprolol succinate (TOPROL-XL) 100 mg, Oral, Daily    montelukast (SINGULAIR) 10 mg, Oral, Daily    omeprazole (PRILOSEC) 20 mg, Oral, Daily    venlafaxine (EFFEXOR-XR) 150 mg, Oral, Daily        Follow up in about 27 weeks (around 4/29/2024) for Extended chronic condition/DM F/up. In addition to their scheduled follow up, the patient has also been instructed to follow up on as needed basis.

## 2023-10-23 ENCOUNTER — OFFICE VISIT (OUTPATIENT)
Dept: PRIMARY CARE CLINIC | Facility: CLINIC | Age: 68
End: 2023-10-23
Payer: MEDICARE

## 2023-10-23 VITALS
WEIGHT: 242 LBS | HEART RATE: 88 BPM | OXYGEN SATURATION: 97 % | RESPIRATION RATE: 18 BRPM | HEIGHT: 66 IN | DIASTOLIC BLOOD PRESSURE: 82 MMHG | SYSTOLIC BLOOD PRESSURE: 114 MMHG | BODY MASS INDEX: 38.89 KG/M2

## 2023-10-23 DIAGNOSIS — E66.01 SEVERE OBESITY (BMI 35.0-39.9) WITH COMORBIDITY: Chronic | ICD-10-CM

## 2023-10-23 DIAGNOSIS — E11.65 TYPE 2 DIABETES MELLITUS WITH HYPERGLYCEMIA, WITHOUT LONG-TERM CURRENT USE OF INSULIN: Chronic | ICD-10-CM

## 2023-10-23 DIAGNOSIS — R00.2 PALPITATIONS: Chronic | ICD-10-CM

## 2023-10-23 DIAGNOSIS — F41.1 GENERALIZED ANXIETY DISORDER: Chronic | ICD-10-CM

## 2023-10-23 DIAGNOSIS — E78.5 DYSLIPIDEMIA: Chronic | ICD-10-CM

## 2023-10-23 DIAGNOSIS — Z00.00 MEDICARE ANNUAL WELLNESS VISIT, SUBSEQUENT: Primary | ICD-10-CM

## 2023-10-23 DIAGNOSIS — I10 PRIMARY HYPERTENSION: Chronic | ICD-10-CM

## 2023-10-23 LAB
CREAT UR-MCNC: 44.2 MG/DL (ref 45–106)
MICROALBUMIN UR-MCNC: <5 UG/ML
MICROALBUMIN/CREAT RATIO PNL UR: ABNORMAL

## 2023-10-23 PROCEDURE — 82043 UR ALBUMIN QUANTITATIVE: CPT | Performed by: GENERAL PRACTICE

## 2023-10-23 PROCEDURE — G0439 PR MEDICARE ANNUAL WELLNESS SUBSEQUENT VISIT: ICD-10-PCS | Mod: ,,, | Performed by: GENERAL PRACTICE

## 2023-10-23 PROCEDURE — G0439 PPPS, SUBSEQ VISIT: HCPCS | Mod: ,,, | Performed by: GENERAL PRACTICE

## 2023-10-23 RX ORDER — LORAZEPAM 0.5 MG/1
0.5 TABLET ORAL EVERY 6 HOURS PRN
Qty: 30 TABLET | Refills: 2 | Status: SHIPPED | OUTPATIENT
Start: 2023-10-23 | End: 2023-11-22

## 2023-11-06 LAB — BCS RECOMMENDATION EXT: NORMAL

## 2024-04-01 DIAGNOSIS — J30.2 SEASONAL ALLERGIES: ICD-10-CM

## 2024-04-01 DIAGNOSIS — E78.5 HYPERLIPIDEMIA, UNSPECIFIED HYPERLIPIDEMIA TYPE: ICD-10-CM

## 2024-04-01 DIAGNOSIS — I10 PRIMARY HYPERTENSION: Chronic | ICD-10-CM

## 2024-04-01 RX ORDER — MONTELUKAST SODIUM 10 MG/1
10 TABLET ORAL DAILY
Qty: 90 TABLET | Refills: 3 | Status: SHIPPED | OUTPATIENT
Start: 2024-04-01

## 2024-04-01 RX ORDER — LISINOPRIL 20 MG/1
20 TABLET ORAL DAILY
Qty: 90 TABLET | Refills: 3 | Status: SHIPPED | OUTPATIENT
Start: 2024-04-01

## 2024-04-01 RX ORDER — ATORVASTATIN CALCIUM 20 MG/1
20 TABLET, FILM COATED ORAL DAILY
Qty: 90 TABLET | Refills: 3 | Status: SHIPPED | OUTPATIENT
Start: 2024-04-01

## 2024-04-25 ENCOUNTER — CLINICAL SUPPORT (OUTPATIENT)
Dept: PRIMARY CARE CLINIC | Facility: CLINIC | Age: 69
End: 2024-04-25
Payer: MEDICARE

## 2024-04-25 DIAGNOSIS — E11.65 TYPE 2 DIABETES MELLITUS WITH HYPERGLYCEMIA, WITHOUT LONG-TERM CURRENT USE OF INSULIN: Chronic | ICD-10-CM

## 2024-04-25 LAB
CREAT UR-MCNC: 64.7 MG/DL (ref 45–106)
EST. AVERAGE GLUCOSE BLD GHB EST-MCNC: 182.9 MG/DL
HBA1C MFR BLD: 8 %
MICROALBUMIN UR-MCNC: <5 UG/ML
MICROALBUMIN/CREAT RATIO PNL UR: NORMAL

## 2024-04-25 PROCEDURE — 36415 COLL VENOUS BLD VENIPUNCTURE: CPT | Mod: ,,, | Performed by: GENERAL PRACTICE

## 2024-04-25 PROCEDURE — 82043 UR ALBUMIN QUANTITATIVE: CPT | Performed by: GENERAL PRACTICE

## 2024-04-25 PROCEDURE — 36415 COLL VENOUS BLD VENIPUNCTURE: CPT

## 2024-04-25 PROCEDURE — 83036 HEMOGLOBIN GLYCOSYLATED A1C: CPT | Performed by: GENERAL PRACTICE

## 2024-04-25 NOTE — PROGRESS NOTES
Patient verified name and .Patient here for nurse visit to draw A1C labs with butterfly needle. Patient was drawn in right hand.No complications or issues occurred. Patient tolerated venipuncture well. Patient expressed no questions or concerns.

## 2024-04-29 ENCOUNTER — OFFICE VISIT (OUTPATIENT)
Dept: PRIMARY CARE CLINIC | Facility: CLINIC | Age: 69
End: 2024-04-29
Payer: MEDICARE

## 2024-04-29 VITALS
DIASTOLIC BLOOD PRESSURE: 79 MMHG | HEIGHT: 66 IN | HEART RATE: 80 BPM | SYSTOLIC BLOOD PRESSURE: 119 MMHG | OXYGEN SATURATION: 98 % | BODY MASS INDEX: 40.02 KG/M2 | WEIGHT: 249 LBS | RESPIRATION RATE: 18 BRPM

## 2024-04-29 DIAGNOSIS — E11.65 TYPE 2 DIABETES MELLITUS WITH HYPERGLYCEMIA, WITHOUT LONG-TERM CURRENT USE OF INSULIN: Chronic | ICD-10-CM

## 2024-04-29 DIAGNOSIS — Z00.00 WELLNESS EXAMINATION: ICD-10-CM

## 2024-04-29 DIAGNOSIS — F41.1 GENERALIZED ANXIETY DISORDER: Chronic | ICD-10-CM

## 2024-04-29 DIAGNOSIS — E66.01 CLASS 3 SEVERE OBESITY DUE TO EXCESS CALORIES WITH SERIOUS COMORBIDITY AND BODY MASS INDEX (BMI) OF 40.0 TO 44.9 IN ADULT: ICD-10-CM

## 2024-04-29 DIAGNOSIS — E78.5 DYSLIPIDEMIA: Chronic | ICD-10-CM

## 2024-04-29 DIAGNOSIS — I10 PRIMARY HYPERTENSION: Primary | Chronic | ICD-10-CM

## 2024-04-29 DIAGNOSIS — R00.2 PALPITATIONS: Chronic | ICD-10-CM

## 2024-04-29 DIAGNOSIS — F41.8 MIXED ANXIETY DEPRESSIVE DISORDER: ICD-10-CM

## 2024-04-29 PROBLEM — E66.813 CLASS 3 SEVERE OBESITY DUE TO EXCESS CALORIES WITH SERIOUS COMORBIDITY AND BODY MASS INDEX (BMI) OF 40.0 TO 44.9 IN ADULT: Status: ACTIVE | Noted: 2024-04-29

## 2024-04-29 PROCEDURE — 99214 OFFICE O/P EST MOD 30 MIN: CPT | Mod: ,,, | Performed by: GENERAL PRACTICE

## 2024-04-29 RX ORDER — LORAZEPAM 0.5 MG/1
0.5 TABLET ORAL EVERY 6 HOURS PRN
Qty: 30 TABLET | Refills: 2 | Status: SHIPPED | OUTPATIENT
Start: 2024-04-29 | End: 2024-05-29

## 2024-04-29 RX ORDER — METFORMIN HYDROCHLORIDE 1000 MG/1
1000 TABLET ORAL 2 TIMES DAILY WITH MEALS
Qty: 180 TABLET | Refills: 3 | Status: SHIPPED | OUTPATIENT
Start: 2024-04-29 | End: 2025-04-29

## 2024-04-29 NOTE — PROGRESS NOTES
"Subjective:       Patient ID: Lola Arana is a 68 y.o. female.    Chief Complaint: Diabetes and Follow-up    Patient presents to the clinic today for chronic condition follow-up.  Doing well, no specific complaints, tolerating all medications, reporting no significant side effects or problems.    Last wellness exam was     Chronic conditions being treated include but are not limited to primary hypertension, diabetes mellitus, dyslipidemia, generalized anxiety disorder,    Review of Systems   Constitutional: Negative.  Negative for fatigue and fever.   HENT: Negative.  Negative for sore throat and trouble swallowing.    Eyes: Negative.  Negative for redness and visual disturbance.   Respiratory: Negative.  Negative for chest tightness and shortness of breath.    Cardiovascular: Negative.  Negative for chest pain.   Gastrointestinal: Negative.  Negative for abdominal pain, blood in stool and nausea.   Endocrine: Negative.  Negative for cold intolerance, heat intolerance and polyuria.   Genitourinary: Negative.    Musculoskeletal: Negative.  Negative for arthralgias, gait problem and joint swelling.   Integumentary:  Negative for rash. Negative.   Neurological: Negative.  Negative for dizziness, weakness and headaches.   Psychiatric/Behavioral: Negative.  Negative for agitation and dysphoric mood.            Patient Care Team:  Eder Combs MD as PCP - General (Family Medicine)  Rogers Cline DPM as Consulting Physician (Podiatry)  Tara Felipe MD as Obstetrician (Obstetrics)  Elton Vega MD (Dermatology)  Edward Quiroz MD as Consulting Physician (Ophthalmology)  Yaakov Drew DPM as Consulting Physician (Podiatry)  Tom Barone MD as Consulting Physician (Neurosurgery)  [facility] -  Objective:   Visit Vitals  /79   Pulse 80   Resp 18   Ht 5' 6" (1.676 m)   Wt 112.9 kg (249 lb)   SpO2 98%   BMI 40.19 kg/m²        Physical Exam  Constitutional:       " Appearance: She is obese.   HENT:      Head: Normocephalic.      Mouth/Throat:      Mouth: Mucous membranes are moist.      Pharynx: Oropharynx is clear.   Eyes:      Pupils: Pupils are equal, round, and reactive to light.   Cardiovascular:      Rate and Rhythm: Normal rate and regular rhythm.   Pulmonary:      Effort: Pulmonary effort is normal.      Breath sounds: Normal breath sounds.   Abdominal:      Palpations: Abdomen is soft.   Musculoskeletal:         General: Normal range of motion.   Skin:     General: Skin is warm and dry.   Neurological:      General: No focal deficit present.      Mental Status: She is alert.   Psychiatric:         Mood and Affect: Mood normal.         Behavior: Behavior normal.         Thought Content: Thought content normal.         Judgment: Judgment normal.           Lab Results   Component Value Date     10/11/2023    K 4.1 10/11/2023    CO2 27 10/11/2023    BUN 16.5 10/11/2023    CREATININE 0.84 10/11/2023    CALCIUM 9.7 10/11/2023    ALBUMIN 4.2 10/11/2023    BILITOT 0.6 10/11/2023    ALKPHOS 75 10/11/2023    AST 14 10/11/2023    ALT 22 10/11/2023    EGFRNORACEVR >60 10/11/2023     Lab Results   Component Value Date    WBC 8.26 10/11/2023    RBC 4.50 10/11/2023    HGB 13.7 10/11/2023    HCT 40.7 10/11/2023    MCV 90.4 10/11/2023    RDW 14.2 10/11/2023     10/11/2023      Lab Results   Component Value Date    CHOL 138 10/11/2023    TRIG 143 (H) 10/11/2023    HDL 47 10/11/2023    LDL 62.00 10/11/2023    TOTALCHOLEST 3 10/11/2023     Lab Results   Component Value Date    TSH 1.853 10/11/2023     Lab Results   Component Value Date    HGBA1C 8.0 (H) 04/25/2024        Assessment:       ICD-10-CM ICD-9-CM   1. Primary hypertension  I10 401.9   2. Type 2 diabetes mellitus with hyperglycemia, without long-term current use of insulin  E11.65 250.00     790.29   3. Dyslipidemia  E78.5 272.4   4. Generalized anxiety disorder  F41.1 300.02   5. Mixed anxiety depressive disorder   F41.8 300.4   6. Palpitations  R00.2 785.1   7. Class 3 severe obesity due to excess calories with serious comorbidity and body mass index (BMI) of 40.0 to 44.9 in adult  E66.01 278.01    Z68.41 V85.41   8. Wellness examination  Z00.00 V70.0       Current Outpatient Medications   Medication Instructions    atorvastatin (LIPITOR) 20 mg, Oral, Daily    glipiZIDE (GLUCOTROL) 10 mg, Oral, With breakfast    lisinopriL (PRINIVIL,ZESTRIL) 20 mg, Oral, Daily    LORazepam (ATIVAN) 0.5 mg, Oral, Every 6 hours PRN    meclizine (ANTIVERT) 25 mg, Oral, 3 times daily PRN    metFORMIN (GLUCOPHAGE) 1,000 mg, Oral, 2 times daily with meals    metoprolol succinate (TOPROL-XL) 100 mg, Oral, Daily    montelukast (SINGULAIR) 10 mg, Oral, Daily    omeprazole (PRILOSEC) 20 mg, Oral, Daily    semaglutide (OZEMPIC) 0.25 mg, Subcutaneous, Every 7 days    venlafaxine (EFFEXOR-XR) 150 mg, Oral, Daily     Plan:     Problem List Items Addressed This Visit          Psychiatric    Generalized anxiety disorder (Chronic)    Overview     Prescribed lorazepam 0.5 mg q.6 hours. .  Infrequent current use if at all.         Relevant Medications    LORazepam (ATIVAN) 0.5 MG tablet    Mixed anxiety depressive disorder    Overview     Prescribed Effexor  mg daily.    Patient reports stability of moods, and effectiveness of medications, including no agitation, significant somnolence, or significant bouts of anxiety or depression.  Patient is not having any sleep disturbance.  Current treatment plan will continue, with plans to discuss any significant changes in patient's status if necessary if anything changes in the future.            Cardiac/Vascular    Dyslipidemia (Chronic)    Overview     Prescribed Lipitor 20 mg daily.    Most recent cholesterol/lipid blood testing shows adequate control, continue current treatment plan, including prescription medications if prescribed, and/or diet high in fiber, low in saturated fats as discussed during clinic  visit.         Relevant Orders    Lipid Panel    Primary hypertension - Primary (Chronic)    Overview     Prescribed lisinopril 20 mg daily, Toprol- mg daily.    Blood pressures appear to be adequately controlled with current treatment plan, including prescription blood pressure medication. Continue medical management and continue home blood pressure checks.  Blood pressure should be checked as discussed during medical visits, and average blood pressure should be no greater than 130/80.         Relevant Orders    Comprehensive Metabolic Panel    CBC Auto Differential    TSH    Palpitations (Chronic)    Overview     Prescribed Toprol  mg daily, effective with both blood pressure control and to control palpitations.            Endocrine    Type 2 diabetes mellitus with hyperglycemia, without long-term current use of insulin (Chronic)    Overview     Patient prescribed metformin 1000 mg b.i.d., glipizide XL 10 mg daily, Ozempic 0.25 mg weekly.    Ozempic started on 04/29/2024.    Patient continues to have elevated average blood sugar level, slightly more significant than six months ago at 8.0%.  We will add Ozempic to her medical regimen.         Current Assessment & Plan               Component Ref Range & Units 3 d ago  (4/25/24) 6 mo ago  (10/11/23) 10 mo ago  (6/19/23) 1 yr ago  (2/20/23) 1 yr ago  (11/29/22) 1 yr ago  (7/20/22)   Hemoglobin A1c <=7.0 % 8.0 High  7.3 High  8.7 High  8.6 High  8.5 High  8.4 High    Estimated Average Glucose mg/dL 182.9 162.8 203.0 200.1 197.3 194.4               Relevant Medications    metFORMIN (GLUCOPHAGE) 1000 MG tablet    semaglutide (OZEMPIC) 0.25 mg or 0.5 mg (2 mg/3 mL) pen injector    Other Relevant Orders    Hemoglobin A1C    Hemoglobin A1C    Class 3 severe obesity due to excess calories with serious comorbidity and body mass index (BMI) of 40.0 to 44.9 in adult    Overview     Patient continues to have a BMI that falls in the obesity range.    Weight loss,  "healthy dietary choices, increased activity/exercise are recommended.  To lose weight, it is generally recommended to restrict calories to approximately 1500 calories per day to lose 1 lb per week, and approximately 1200 calories per day to lose up to 2 lb per week.  Generally, this is a healthy amount of weight to lose, and an appropriate amount of time to lose this amount of weight.  Adding exercise will assist in losing weight, particularly aerobic exercise such as walking.  However, resistance training, or lifting weights" over time may assistant weight loss by increasing basal metabolic rate, or the rate at which your body burns calories during periods of inactivity.    Keep track of BMI (body mass index), below 25 is considered normal, over 25 but below 30 is considered overweight, anything over 30 is considered moderately obese, over 35 severely obese, and over 40 is characterized as morbidly obese.          Other Visit Diagnoses       Wellness examination        This diagnosis does not apply to this visit, wellness exam with pre visit labs will be scheduled/ordered for future visit.                  Follow-up in three months for diabetes recheck.    Follow up in about 27 weeks (around 11/4/2024) for Medicare Wellness.  "

## 2024-04-29 NOTE — ASSESSMENT & PLAN NOTE
Component Ref Range & Units 3 d ago  (4/25/24) 6 mo ago  (10/11/23) 10 mo ago  (6/19/23) 1 yr ago  (2/20/23) 1 yr ago  (11/29/22) 1 yr ago  (7/20/22)   Hemoglobin A1c <=7.0 % 8.0 High  7.3 High  8.7 High  8.6 High  8.5 High  8.4 High    Estimated Average Glucose mg/dL 182.9 162.8 203.0 200.1 197.3 194.4

## 2024-04-29 NOTE — LETTER
AUTHORIZATION FOR RELEASE OF   CONFIDENTIAL INFORMATION    Dear Lola Ferreiraaux,    We are seeing Lola Arana, date of birth 1955, in the clinic at [facility]. Eder Combs MD is the patient's PCP. Lola Arana has an outstanding lab/procedure at the time we reviewed her chart. In order to help keep her health information updated, she has authorized us to request the following medical record(s):        (  X)  MAMMOGRAM                                      (  )  COLONOSCOPY      (  )  PAP SMEAR                                          (  )  OUTSIDE LAB RESULTS     (  )  DEXA SCAN                                          (  )  EYE EXAM            (  )  FOOT EXAM                                          (  )  ENTIRE RECORD     (  )  OUTSIDE IMMUNIZATIONS                 (  )  _______________         Please fax records to [facility], [place], P[place], [place], [phone]     If you have any questions, please contact Mary Arauz LPN at [phone].           Patient Name: Lola Arana  : 1955  Patient Phone #: [phone]

## 2024-04-30 ENCOUNTER — DOCUMENTATION ONLY (OUTPATIENT)
Dept: PRIMARY CARE CLINIC | Facility: CLINIC | Age: 69
End: 2024-04-30
Payer: MEDICARE

## 2024-05-01 ENCOUNTER — PATIENT MESSAGE (OUTPATIENT)
Dept: PRIMARY CARE CLINIC | Facility: CLINIC | Age: 69
End: 2024-05-01
Payer: MEDICARE

## 2024-05-30 DIAGNOSIS — I10 PRIMARY HYPERTENSION: ICD-10-CM

## 2024-05-30 RX ORDER — METOPROLOL SUCCINATE 100 MG/1
100 TABLET, EXTENDED RELEASE ORAL DAILY
Qty: 90 TABLET | Refills: 3 | Status: SHIPPED | OUTPATIENT
Start: 2024-05-30 | End: 2025-05-30

## 2024-06-12 DIAGNOSIS — F41.1 GENERALIZED ANXIETY DISORDER: Primary | ICD-10-CM

## 2024-06-12 RX ORDER — VENLAFAXINE HYDROCHLORIDE 150 MG/1
150 CAPSULE, EXTENDED RELEASE ORAL DAILY
Qty: 90 CAPSULE | Refills: 3 | Status: SHIPPED | OUTPATIENT
Start: 2024-06-12 | End: 2025-06-12

## 2024-07-02 DIAGNOSIS — E78.5 HYPERLIPIDEMIA, UNSPECIFIED HYPERLIPIDEMIA TYPE: ICD-10-CM

## 2024-07-02 RX ORDER — GLIPIZIDE 10 MG/1
10 TABLET, FILM COATED, EXTENDED RELEASE ORAL
Qty: 90 TABLET | Refills: 3 | Status: SHIPPED | OUTPATIENT
Start: 2024-07-02 | End: 2025-07-02

## 2024-07-24 ENCOUNTER — CLINICAL SUPPORT (OUTPATIENT)
Dept: PRIMARY CARE CLINIC | Facility: CLINIC | Age: 69
End: 2024-07-24
Payer: MEDICARE

## 2024-07-24 DIAGNOSIS — E11.65 TYPE 2 DIABETES MELLITUS WITH HYPERGLYCEMIA, WITHOUT LONG-TERM CURRENT USE OF INSULIN: Chronic | ICD-10-CM

## 2024-07-24 LAB
EST. AVERAGE GLUCOSE BLD GHB EST-MCNC: 145.6 MG/DL
HBA1C MFR BLD: 6.7 %

## 2024-07-24 PROCEDURE — 36415 COLL VENOUS BLD VENIPUNCTURE: CPT | Mod: ,,, | Performed by: GENERAL PRACTICE

## 2024-07-24 PROCEDURE — 36415 COLL VENOUS BLD VENIPUNCTURE: CPT

## 2024-07-24 PROCEDURE — 83036 HEMOGLOBIN GLYCOSYLATED A1C: CPT | Performed by: GENERAL PRACTICE

## 2024-07-24 NOTE — PROGRESS NOTES
Patient presented for nurse visit/Blood draw. 23g needle X 1 attempt unsuccessful ,in left antecubital.   23g 1 inch needle to right antecubital successful .  Patient tolerated procedure well.

## 2024-07-29 NOTE — PROGRESS NOTES
"Subjective:       Patient ID: Lola Arana is a 69 y.o. female.    Chief Complaint: Diabetes and Follow-up    Patient presents to the clinic today for chronic condition follow-up.  Doing well, no specific complaints, tolerating all medications, reporting no significant side effects or problems.    Last wellness exam was 10/23/2023.    Chronic conditions being treated include but are not limited to diabetes mellitus, primary HTN, dyslipidemia.      Review of Systems   Constitutional: Negative.  Negative for fatigue and fever.   HENT: Negative.  Negative for sore throat and trouble swallowing.    Eyes: Negative.  Negative for redness and visual disturbance.   Respiratory: Negative.  Negative for chest tightness and shortness of breath.    Cardiovascular: Negative.  Negative for chest pain.   Gastrointestinal: Negative.  Negative for abdominal pain, blood in stool and nausea.   Endocrine: Negative.  Negative for cold intolerance, heat intolerance and polyuria.   Genitourinary: Negative.    Musculoskeletal: Negative.  Negative for arthralgias, gait problem and joint swelling.   Integumentary:  Negative for rash. Negative.   Neurological: Negative.  Negative for dizziness, weakness and headaches.   Psychiatric/Behavioral: Negative.  Negative for agitation and dysphoric mood.            Patient Care Team:  Eder Combs MD as PCP - General (Family Medicine)  Rogers Cline DPM as Consulting Physician (Podiatry)  Tara Felipe MD as Obstetrician (Obstetrics)  Elton Vega MD (Dermatology)  Edward Quiroz MD as Consulting Physician (Ophthalmology)  Yaakov Drew DPM as Consulting Physician (Podiatry)  Tom Barone MD as Consulting Physician (Neurosurgery)  [facility] -  Objective:   Visit Vitals  /75   Pulse 88   Resp 18   Ht 5' 6" (1.676 m)   Wt 111.1 kg (245 lb)   SpO2 98%   BMI 39.54 kg/m²        Physical Exam  Constitutional:       Appearance: She is obese. "   HENT:      Head: Normocephalic.      Mouth/Throat:      Mouth: Mucous membranes are moist.      Pharynx: Oropharynx is clear.   Eyes:      Pupils: Pupils are equal, round, and reactive to light.   Cardiovascular:      Rate and Rhythm: Normal rate and regular rhythm.   Pulmonary:      Effort: Pulmonary effort is normal.      Breath sounds: Normal breath sounds.   Abdominal:      Palpations: Abdomen is soft.   Musculoskeletal:         General: Normal range of motion.   Skin:     General: Skin is warm and dry.   Neurological:      General: No focal deficit present.      Mental Status: She is alert.   Psychiatric:         Mood and Affect: Mood normal.         Behavior: Behavior normal.         Thought Content: Thought content normal.         Judgment: Judgment normal.           Lab Results   Component Value Date     10/11/2023    K 4.1 10/11/2023     10/11/2023    CO2 27 10/11/2023    BUN 16.5 10/11/2023    CREATININE 0.84 10/11/2023    CALCIUM 9.7 10/11/2023    ALBUMIN 4.2 10/11/2023    BILITOT 0.6 10/11/2023    ALKPHOS 75 10/11/2023    AST 14 10/11/2023    ALT 22 10/11/2023    EGFRNORACEVR >60 10/11/2023     Lab Results   Component Value Date    WBC 8.26 10/11/2023    RBC 4.50 10/11/2023    HGB 13.7 10/11/2023    HCT 40.7 10/11/2023    MCV 90.4 10/11/2023    RDW 14.2 10/11/2023     10/11/2023      Lab Results   Component Value Date    CHOL 138 10/11/2023    TRIG 143 (H) 10/11/2023    HDL 47 10/11/2023    LDL 62.00 10/11/2023    TOTALCHOLEST 3 10/11/2023     Lab Results   Component Value Date    TSH 1.853 10/11/2023     Lab Results   Component Value Date    HGBA1C 6.7 07/24/2024       Assessment:       ICD-10-CM ICD-9-CM   1. Type 2 diabetes mellitus without complication, without long-term current use of insulin  E11.9 250.00   2. Primary hypertension  I10 401.9   3. Dyslipidemia  E78.5 272.4   4. Generalized anxiety disorder  F41.1 300.02   5. Vertigo  R42 780.4       Current Outpatient  Medications   Medication Instructions    atorvastatin (LIPITOR) 20 mg, Oral, Daily    glipiZIDE (GLUCOTROL) 10 mg, Oral, With breakfast    lisinopriL (PRINIVIL,ZESTRIL) 20 mg, Oral, Daily    LORazepam (ATIVAN) 0.5 mg, Oral, Every 6 hours PRN    meclizine (ANTIVERT) 25 mg, Oral, 3 times daily PRN    metFORMIN (GLUCOPHAGE) 1,000 mg, Oral, 2 times daily with meals    metoprolol succinate (TOPROL-XL) 100 mg, Oral, Daily    montelukast (SINGULAIR) 10 mg, Oral, Daily    omeprazole (PRILOSEC) 20 mg, Oral, Daily    semaglutide (OZEMPIC) 0.5 mg, Subcutaneous, Every 7 days    venlafaxine (EFFEXOR-XR) 150 mg, Oral, Daily     Plan:     Problem List Items Addressed This Visit          Psychiatric    Generalized anxiety disorder (Chronic)    Overview     Prescribed lorazepam 0.5 mg q.6 hours. .  Infrequent current use if at all.         Relevant Medications    LORazepam (ATIVAN) 0.5 MG tablet       ENT    Vertigo (Chronic)    Relevant Medications    meclizine (ANTIVERT) 25 mg tablet       Cardiac/Vascular    Dyslipidemia (Chronic)    Overview     Prescribed Lipitor 20 mg daily.    Most recent cholesterol/lipid blood testing shows adequate control, continue current treatment plan, including prescription medications if prescribed, and/or diet high in fiber, low in saturated fats as discussed during clinic visit.    Medication will be continued at current dosage.         Primary hypertension (Chronic)    Overview     Prescribed lisinopril 20 mg daily, Toprol- mg daily.    Blood pressures appear to be adequately controlled with current treatment plan, including prescription blood pressure medication.  Medication(s) appear to be effective at current dosages, and are not causing any side effects or problems.  Continue medical management and continue home blood pressure checks.  Average blood pressures at home should be no greater than 130/80.  Patient instructed to contact the clinic if blood pressures become elevated at any  point prior to next clinic visit.    Medication will be continued at the current dosage.            Endocrine    Type 2 diabetes mellitus without complication, without long-term current use of insulin - Primary (Chronic)    Overview     Patient prescribed metformin 1000 mg b.i.d., glipizide XL 10 mg daily, Ozempic 0.5 mg weekly.    Ozempic dose increase on 07/30/2024.    Most recent hemoglobin A1c shows good control on diabetic treatment plan, including diabetic prescription medication.  Continue current treatment plan, medical regimen, and lifestyle modification as discussed at this visit and during previous visits.    Medication will be continued at current dosage.         Current Assessment & Plan     Component Ref Range & Units 5 d ago  (7/24/24) 3 mo ago  (4/25/24) 9 mo ago  (10/11/23) 1 yr ago  (6/19/23) 1 yr ago  (2/20/23) 1 yr ago  (11/29/22) 2 yr ago  (7/20/22)   Hemoglobin A1c <=7.0 % 6.7 8.0 High  7.3 High  8.7 High  8.6 High  8.5 High  8.4 High    Estimated Average Glucose mg/dL 145.6 182.9 162.8 203.0 200.1 197.3 194.4            Relevant Medications    semaglutide (OZEMPIC) 0.25 mg or 0.5 mg (2 mg/3 mL) pen injector               Follow up for next appointment as scheduled or as needed, wellness scheduled for October 2024.

## 2024-07-30 ENCOUNTER — OFFICE VISIT (OUTPATIENT)
Dept: PRIMARY CARE CLINIC | Facility: CLINIC | Age: 69
End: 2024-07-30
Payer: MEDICARE

## 2024-07-30 VITALS
SYSTOLIC BLOOD PRESSURE: 108 MMHG | BODY MASS INDEX: 39.37 KG/M2 | DIASTOLIC BLOOD PRESSURE: 75 MMHG | WEIGHT: 245 LBS | HEART RATE: 88 BPM | HEIGHT: 66 IN | OXYGEN SATURATION: 98 % | RESPIRATION RATE: 18 BRPM

## 2024-07-30 DIAGNOSIS — F41.1 GENERALIZED ANXIETY DISORDER: Chronic | ICD-10-CM

## 2024-07-30 DIAGNOSIS — R42 VERTIGO: Chronic | ICD-10-CM

## 2024-07-30 DIAGNOSIS — E11.9 TYPE 2 DIABETES MELLITUS WITHOUT COMPLICATION, WITHOUT LONG-TERM CURRENT USE OF INSULIN: Primary | Chronic | ICD-10-CM

## 2024-07-30 DIAGNOSIS — E78.5 DYSLIPIDEMIA: Chronic | ICD-10-CM

## 2024-07-30 DIAGNOSIS — I10 PRIMARY HYPERTENSION: Chronic | ICD-10-CM

## 2024-07-30 PROCEDURE — 99214 OFFICE O/P EST MOD 30 MIN: CPT | Mod: ,,, | Performed by: GENERAL PRACTICE

## 2024-07-30 RX ORDER — MECLIZINE HYDROCHLORIDE 25 MG/1
25 TABLET ORAL 3 TIMES DAILY PRN
Qty: 30 TABLET | Refills: 5 | Status: SHIPPED | OUTPATIENT
Start: 2024-07-30 | End: 2025-01-26

## 2024-07-30 RX ORDER — LORAZEPAM 0.5 MG/1
0.5 TABLET ORAL EVERY 6 HOURS PRN
Qty: 30 TABLET | Refills: 2 | Status: SHIPPED | OUTPATIENT
Start: 2024-07-30 | End: 2024-08-29

## 2024-10-02 ENCOUNTER — HOSPITAL ENCOUNTER (OUTPATIENT)
Dept: RADIOLOGY | Facility: CLINIC | Age: 69
Discharge: HOME | End: 2024-10-02
Attending: ORTHOPAEDIC SURGERY
Payer: MEDICARE

## 2024-10-02 ENCOUNTER — OFFICE VISIT (OUTPATIENT)
Dept: ORTHOPEDICS | Facility: CLINIC | Age: 69
End: 2024-10-02
Payer: MEDICARE

## 2024-10-02 VITALS
HEIGHT: 66 IN | DIASTOLIC BLOOD PRESSURE: 80 MMHG | WEIGHT: 245.38 LBS | SYSTOLIC BLOOD PRESSURE: 141 MMHG | BODY MASS INDEX: 39.43 KG/M2 | HEART RATE: 85 BPM

## 2024-10-02 DIAGNOSIS — M79.642 LEFT HAND PAIN: ICD-10-CM

## 2024-10-02 DIAGNOSIS — M18.12 ARTHRITIS OF CARPOMETACARPAL (CMC) JOINT OF LEFT THUMB: Primary | ICD-10-CM

## 2024-10-02 PROCEDURE — 73130 X-RAY EXAM OF HAND: CPT | Mod: LT,,, | Performed by: ORTHOPAEDIC SURGERY

## 2024-10-02 NOTE — PROGRESS NOTES
Chief Complaint:   Chief Complaint   Patient presents with    Left Hand - Pain     Ongoing pain for a couple year and pain progressed into a constant pain.  Has had carpal tunnel sx in  which has relieved pain in wrist but now in thumb and in wrist. Stated has been using Voltaren gel and advil which  helps some of the pain. Denies any numbness or swelling.        Consulting Physician: No ref. provider found    History of present illness:  She is a pleasant 66-year-old who returns with left thumb pain due to known CMC arthritis. Her pain is along the CMC joint. Worse with any activity and is now constant. Not much better at rest. She has tried bracing and OTC medications without relief.     Past Medical History:   Diagnosis Date    Anxiety disorder, unspecified     Arthritis     Diabetes mellitus, type 2     Fractures     HLD (hyperlipidemia)     Hypertension     Palpitations     Respiratory distress        Past Surgical History:   Procedure Laterality Date    ANTERIOR CERVICAL DISCECTOMY W/ FUSION  10/16/2009    C5-6, C6-7 ACDF.  Dr. Clemens    CARPAL TUNNEL RELEASE Left 2021    Dr. Barone    CARPAL TUNNEL RELEASE Right      SECTION      CHOLECYSTECTOMY  1999    EYE SURGERY  May 2023    Cataract    FOOT SURGERY      HERNIA REPAIR      JOINT REPLACEMENT  2009    Right TK    KNEE ARTHROSCOPY      KNEE SURGERY      LAPAROSCOPIC GASTRIC BANDING      PHACOEMULSIFICATION, CATARACT, WITH IOL INSERTION Right 2023    Procedure: PHACOEMULSIFICATION, CATARACT, WITH IOL INSERTION, EYHANCE- OD;  Surgeon: Edward Quiroz MD;  Location: [facility] OR;  Service: Ophthalmology;  Laterality: Right;    PHACOEMULSIFICATION, CATARACT, WITH IOL INSERTION Left 2023    Procedure: PHACOEMULSIFICATION, CATARACT, WITH IOL INSERTION- OS;  Surgeon: Edward Quiroz MD;  Location: [facility] OR;  Service: Ophthalmology;  Laterality: Left;    SPINE SURGERY  2009    ACF Right       Current Outpatient  Medications   Medication Sig    atorvastatin (LIPITOR) 20 MG tablet Take 1 tablet (20 mg total) by mouth once daily.    glipiZIDE (GLUCOTROL) 10 MG TR24 Take 1 tablet (10 mg total) by mouth daily with breakfast.    lisinopriL (PRINIVIL,ZESTRIL) 20 MG tablet Take 1 tablet (20 mg total) by mouth once daily.    LORazepam (ATIVAN) 0.5 MG tablet Take 1 tablet (0.5 mg total) by mouth every 6 (six) hours as needed for Anxiety.    meclizine (ANTIVERT) 25 mg tablet Take 1 tablet (25 mg total) by mouth 3 (three) times daily as needed for Dizziness.    metFORMIN (GLUCOPHAGE) 1000 MG tablet Take 1 tablet (1,000 mg total) by mouth 2 (two) times daily with meals.    metoprolol succinate (TOPROL-XL) 100 MG 24 hr tablet Take 1 tablet (100 mg total) by mouth once daily.    montelukast (SINGULAIR) 10 mg tablet Take 1 tablet (10 mg total) by mouth once daily.    semaglutide (OZEMPIC) 0.25 mg or 0.5 mg (2 mg/3 mL) pen injector Inject 0.5 mg into the skin every 7 days.    venlafaxine (EFFEXOR-XR) 150 MG Cp24 Take 1 capsule (150 mg total) by mouth once daily.    omeprazole (PRILOSEC) 20 MG capsule Take 1 capsule (20 mg total) by mouth once daily. for 14 days     No current facility-administered medications for this visit.       Review of patient's allergies indicates:  No Known Allergies    Family History   Problem Relation Name Age of Onset    Arthritis Mother Same     Cancer Mother Same         Breast and cervical. 78y/o    Hypertension Mother Same     COPD Father Reid Villafuerte        Social History     Socioeconomic History    Marital status:    Tobacco Use    Smoking status: Never     Passive exposure: Never    Smokeless tobacco: Never    Tobacco comments:     Never   Substance and Sexual Activity    Alcohol use: Not Currently    Drug use: Not Currently    Sexual activity: Not Currently     Partners: Male     Birth control/protection: None       Review of Systems:    Constitution:   Denies chills, fever, and  "sweats.  HENT:   Denies headaches or blurry vision.  Cardiovascular:  Denies chest pain or irregular heart beat.  Respiratory:   Denies cough or shortness of breath.  Gastrointestinal:  Denies abdominal pain, nausea, or vomiting.  Musculoskeletal:   Denies muscle cramps.  Neurological:   Denies dizziness or focal weakness.  Psychiatric/Behavior: Normal mental status.  Hematology/Lymph:  Denies bleeding problem or easy bruising/bleeding.  Skin:    Denies rash or suspicious lesions.    Examination:    Vital Signs:    Vitals:    10/02/24 1626   BP: (!) 141/80   Pulse: 85   Weight: 111.3 kg (245 lb 6 oz)   Height: 5' 6" (1.676 m)       Body mass index is 39.6 kg/m².    Constitution:   Well-developed, well nourished patient in no acute distress.  Neurological:   Alert and oriented x 3 and cooperative to examination.     Psychiatric/Behavior: Normal mental status.  Respiratory:   No shortness of breath.  Eyes:    Extraoccular muscles intact  Skin:    No scars, rash or suspicious lesions.    MSK:   Left thumb without obvious deformity. ROM full. Distally neurovascular intact. Nontender to palpation. Positive grind test.     Imaging: X-rays ordered and images interpreted today personally by me of 4 views of her left hand/thumb show arthritis of the CMC joint        Assessment: Arthritis of carpometacarpal (CMC) joint of left thumb  -     X-Ray Hand 3 view Left; Future; Expected date: 10/02/2024        Plan:  I think her best option would be a left thumb CMC arthroplasty.  We discussed the details of the procedure and expected postoperative course.  We discussed the benefits of surgery which be to decrease her pain and increase her function.  We discussed the risks of surgery which is small but could be significant if she has pain, stiffness, or infection.  After discussion she would like to proceed.  She has a follow up appointment with her 's oncologist and will call me when she would like to pursue.    Kieran Duran, " MD personally performed the services described in this documentation, including but not limited to patient's history, physical examination, and assessment and plan of care. All medical record entries made by JACQUELINE Bear were performed at his direction and in his presence. The medical record was reviewed and is accurate and complete.

## 2024-10-30 ENCOUNTER — PATIENT MESSAGE (OUTPATIENT)
Dept: PRIMARY CARE CLINIC | Facility: CLINIC | Age: 69
End: 2024-10-30
Payer: MEDICARE

## 2024-11-20 ENCOUNTER — CLINICAL SUPPORT (OUTPATIENT)
Dept: PRIMARY CARE CLINIC | Facility: CLINIC | Age: 69
End: 2024-11-20
Payer: MEDICARE

## 2024-11-20 DIAGNOSIS — E11.65 TYPE 2 DIABETES MELLITUS WITH HYPERGLYCEMIA, WITHOUT LONG-TERM CURRENT USE OF INSULIN: Chronic | ICD-10-CM

## 2024-11-20 DIAGNOSIS — I10 PRIMARY HYPERTENSION: ICD-10-CM

## 2024-11-20 DIAGNOSIS — Z00.00 ENCOUNTER FOR ANNUAL WELLNESS EXAM IN MEDICARE PATIENT: Primary | ICD-10-CM

## 2024-11-20 DIAGNOSIS — E78.5 DYSLIPIDEMIA: Chronic | ICD-10-CM

## 2024-11-20 LAB
ALBUMIN SERPL-MCNC: 4.3 G/DL (ref 3.4–4.8)
ALBUMIN/GLOB SERPL: 1.8 RATIO (ref 1.1–2)
ALP SERPL-CCNC: 65 UNIT/L (ref 40–150)
ALT SERPL-CCNC: 21 UNIT/L (ref 0–55)
ANION GAP SERPL CALC-SCNC: 13 MEQ/L
AST SERPL-CCNC: 15 UNIT/L (ref 5–34)
BASOPHILS # BLD AUTO: 0.05 X10(3)/MCL
BASOPHILS NFR BLD AUTO: 0.6 %
BILIRUB SERPL-MCNC: 0.5 MG/DL
BUN SERPL-MCNC: 18.5 MG/DL (ref 9.8–20.1)
CALCIUM SERPL-MCNC: 9.9 MG/DL (ref 8.4–10.2)
CHLORIDE SERPL-SCNC: 107 MMOL/L (ref 98–107)
CHOLEST SERPL-MCNC: 139 MG/DL
CHOLEST/HDLC SERPL: 3 {RATIO} (ref 0–5)
CO2 SERPL-SCNC: 27 MMOL/L (ref 23–31)
CREAT SERPL-MCNC: 0.98 MG/DL (ref 0.55–1.02)
CREAT/UREA NIT SERPL: 19
EOSINOPHIL # BLD AUTO: 0.2 X10(3)/MCL (ref 0–0.9)
EOSINOPHIL NFR BLD AUTO: 2.4 %
ERYTHROCYTE [DISTWIDTH] IN BLOOD BY AUTOMATED COUNT: 14.3 % (ref 11.5–17)
EST. AVERAGE GLUCOSE BLD GHB EST-MCNC: 139.9 MG/DL
GFR SERPLBLD CREATININE-BSD FMLA CKD-EPI: >60 ML/MIN/1.73/M2
GLOBULIN SER-MCNC: 2.4 GM/DL (ref 2.4–3.5)
GLUCOSE SERPL-MCNC: 198 MG/DL (ref 82–115)
HBA1C MFR BLD: 6.5 %
HCT VFR BLD AUTO: 40.8 % (ref 37–47)
HDLC SERPL-MCNC: 50 MG/DL (ref 35–60)
HGB BLD-MCNC: 13.8 G/DL (ref 12–16)
IMM GRANULOCYTES # BLD AUTO: 0.02 X10(3)/MCL (ref 0–0.04)
IMM GRANULOCYTES NFR BLD AUTO: 0.2 %
LDLC SERPL CALC-MCNC: 60 MG/DL (ref 50–140)
LYMPHOCYTES # BLD AUTO: 2.79 X10(3)/MCL (ref 0.6–4.6)
LYMPHOCYTES NFR BLD AUTO: 33.3 %
MCH RBC QN AUTO: 31 PG (ref 27–31)
MCHC RBC AUTO-ENTMCNC: 33.8 G/DL (ref 33–36)
MCV RBC AUTO: 91.7 FL (ref 80–94)
MONOCYTES # BLD AUTO: 0.71 X10(3)/MCL (ref 0.1–1.3)
MONOCYTES NFR BLD AUTO: 8.5 %
NEUTROPHILS # BLD AUTO: 4.6 X10(3)/MCL (ref 2.1–9.2)
NEUTROPHILS NFR BLD AUTO: 55 %
NRBC BLD AUTO-RTO: 0 %
PLATELET # BLD AUTO: 224 X10(3)/MCL (ref 130–400)
PMV BLD AUTO: 11.4 FL (ref 7.4–10.4)
POTASSIUM SERPL-SCNC: 5.8 MMOL/L (ref 3.5–5.1)
PROT SERPL-MCNC: 6.7 GM/DL (ref 5.8–7.6)
RBC # BLD AUTO: 4.45 X10(6)/MCL (ref 4.2–5.4)
SODIUM SERPL-SCNC: 147 MMOL/L (ref 136–145)
TRIGL SERPL-MCNC: 145 MG/DL (ref 37–140)
TSH SERPL-ACNC: 1.57 UIU/ML (ref 0.35–4.94)
VLDLC SERPL CALC-MCNC: 29 MG/DL
WBC # BLD AUTO: 8.37 X10(3)/MCL (ref 4.5–11.5)

## 2024-11-20 PROCEDURE — 85025 COMPLETE CBC W/AUTO DIFF WBC: CPT | Performed by: GENERAL PRACTICE

## 2024-11-20 PROCEDURE — 80061 LIPID PANEL: CPT | Performed by: GENERAL PRACTICE

## 2024-11-20 PROCEDURE — 83036 HEMOGLOBIN GLYCOSYLATED A1C: CPT | Performed by: GENERAL PRACTICE

## 2024-11-20 PROCEDURE — 36415 COLL VENOUS BLD VENIPUNCTURE: CPT

## 2024-11-20 PROCEDURE — 84443 ASSAY THYROID STIM HORMONE: CPT | Performed by: GENERAL PRACTICE

## 2024-11-20 PROCEDURE — 80053 COMPREHEN METABOLIC PANEL: CPT | Performed by: GENERAL PRACTICE

## 2024-12-02 PROBLEM — E66.813 CLASS 3 SEVERE OBESITY DUE TO EXCESS CALORIES WITH SERIOUS COMORBIDITY AND BODY MASS INDEX (BMI) OF 40.0 TO 44.9 IN ADULT: Chronic | Status: ACTIVE | Noted: 2024-04-29

## 2024-12-02 PROBLEM — E66.01 CLASS 3 SEVERE OBESITY DUE TO EXCESS CALORIES WITH SERIOUS COMORBIDITY AND BODY MASS INDEX (BMI) OF 40.0 TO 44.9 IN ADULT: Chronic | Status: ACTIVE | Noted: 2024-04-29

## 2024-12-03 ENCOUNTER — OFFICE VISIT (OUTPATIENT)
Dept: PRIMARY CARE CLINIC | Facility: CLINIC | Age: 69
End: 2024-12-03
Payer: MEDICARE

## 2024-12-03 VITALS
RESPIRATION RATE: 18 BRPM | OXYGEN SATURATION: 97 % | BODY MASS INDEX: 38.09 KG/M2 | WEIGHT: 237 LBS | HEART RATE: 100 BPM | SYSTOLIC BLOOD PRESSURE: 119 MMHG | DIASTOLIC BLOOD PRESSURE: 81 MMHG | HEIGHT: 66 IN

## 2024-12-03 DIAGNOSIS — E11.9 TYPE 2 DIABETES MELLITUS WITHOUT COMPLICATION, WITHOUT LONG-TERM CURRENT USE OF INSULIN: Chronic | ICD-10-CM

## 2024-12-03 DIAGNOSIS — E78.5 DYSLIPIDEMIA: Chronic | ICD-10-CM

## 2024-12-03 DIAGNOSIS — I10 PRIMARY HYPERTENSION: Chronic | ICD-10-CM

## 2024-12-03 DIAGNOSIS — M19.90 OSTEOARTHRITIS, UNSPECIFIED OSTEOARTHRITIS TYPE, UNSPECIFIED SITE: Chronic | ICD-10-CM

## 2024-12-03 DIAGNOSIS — F41.1 GENERALIZED ANXIETY DISORDER: Chronic | ICD-10-CM

## 2024-12-03 DIAGNOSIS — E66.01 CLASS 2 SEVERE OBESITY DUE TO EXCESS CALORIES WITH SERIOUS COMORBIDITY AND BODY MASS INDEX (BMI) OF 38.0 TO 38.9 IN ADULT: ICD-10-CM

## 2024-12-03 DIAGNOSIS — E66.812 CLASS 2 SEVERE OBESITY DUE TO EXCESS CALORIES WITH SERIOUS COMORBIDITY AND BODY MASS INDEX (BMI) OF 38.0 TO 38.9 IN ADULT: ICD-10-CM

## 2024-12-03 DIAGNOSIS — F41.8 MIXED ANXIETY DEPRESSIVE DISORDER: ICD-10-CM

## 2024-12-03 DIAGNOSIS — Z78.0 POSTMENOPAUSAL: Chronic | ICD-10-CM

## 2024-12-03 DIAGNOSIS — Z00.00 MEDICARE ANNUAL WELLNESS VISIT, SUBSEQUENT: Primary | ICD-10-CM

## 2024-12-03 DIAGNOSIS — E87.5 HYPERKALEMIA: ICD-10-CM

## 2024-12-03 PROBLEM — E66.813 CLASS 3 SEVERE OBESITY DUE TO EXCESS CALORIES WITH SERIOUS COMORBIDITY AND BODY MASS INDEX (BMI) OF 40.0 TO 44.9 IN ADULT: Chronic | Status: RESOLVED | Noted: 2024-04-29 | Resolved: 2024-12-03

## 2024-12-03 PROCEDURE — G0439 PPPS, SUBSEQ VISIT: HCPCS | Mod: ,,, | Performed by: GENERAL PRACTICE

## 2024-12-03 NOTE — PROGRESS NOTES
Patient ID: [id number]     Chief Complaint: Annual Exam      HPI:     Lola Arana is a 69 y.o. female here today for a Medicare Wellness. No other complaints today.       -------------------------------------    Anxiety disorder, unspecified    Arthritis    Diabetes mellitus, type 2    Fractures    HLD (hyperlipidemia)    Hypertension    Palpitations    Respiratory distress        Past Surgical History:   Procedure Laterality Date    ANTERIOR CERVICAL DISCECTOMY W/ FUSION  10/16/2009    C5-6, C6-7 ACDF.  Dr. Clemens    CARPAL TUNNEL RELEASE Left 2021    Dr. Barone    CARPAL TUNNEL RELEASE Right      SECTION      CHOLECYSTECTOMY  1999    EYE SURGERY  May 2023    Cataract    FOOT SURGERY      HERNIA REPAIR      JOINT REPLACEMENT  2009    Right TK    KNEE ARTHROSCOPY      KNEE SURGERY      LAPAROSCOPIC GASTRIC BANDING      PHACOEMULSIFICATION, CATARACT, WITH IOL INSERTION Right 2023    Procedure: PHACOEMULSIFICATION, CATARACT, WITH IOL INSERTION, EYHANCE- OD;  Surgeon: Edward Quiroz MD;  Location: [facility] OR;  Service: Ophthalmology;  Laterality: Right;    PHACOEMULSIFICATION, CATARACT, WITH IOL INSERTION Left 2023    Procedure: PHACOEMULSIFICATION, CATARACT, WITH IOL INSERTION- OS;  Surgeon: Edward Quiroz MD;  Location: [facility] OR;  Service: Ophthalmology;  Laterality: Left;    SPINE SURGERY  2009    ACF Right       Review of patient's allergies indicates:  No Known Allergies    No outpatient medications have been marked as taking for the 12/3/24 encounter (Office Visit) with Eder Combs MD.       Social History     Socioeconomic History    Marital status:    Tobacco Use    Smoking status: Never     Passive exposure: Never    Smokeless tobacco: Never    Tobacco comments:     Never   Substance and Sexual Activity    Alcohol use: Not Currently    Drug use: Not Currently    Sexual activity: Not Currently     Partners: Male     Birth control/protection: None         Family History   Problem Relation Name Age of Onset    Arthritis Mother Same     Cancer Mother Same         Breast and cervical. 78y/o    Hypertension Mother Same     COPD Father Reid Villafuerte         Patient Care Team:  Eder Combs MD as PCP - General (Family Medicine)  Rogers Cline DPM as Consulting Physician (Podiatry)  Tara Felipe MD as Obstetrician (Obstetrics)  Elton Vega MD (Dermatology)  Edward Quiroz MD as Consulting Physician (Ophthalmology)  Yaakov Drew DPM as Consulting Physician (Podiatry)  Tom Barone MD as Consulting Physician (Neurosurgery)  [facility] -     Opioid Screening: Patient medication list reviewed, patient is not taking prescription opioids. Patient is at low risk of substance abuse based on this opioid use history.       Subjective:     Review of Systems   Constitutional: Negative.  Negative for malaise/fatigue and weight loss.   HENT: Negative.     Eyes: Negative.    Respiratory: Negative.  Negative for shortness of breath.    Cardiovascular: Negative.  Negative for chest pain.   Gastrointestinal: Negative.    Genitourinary: Negative.    Musculoskeletal: Negative.    Skin: Negative.    Neurological: Negative.  Negative for focal weakness, weakness and headaches.   Endo/Heme/Allergies: Negative.    Psychiatric/Behavioral: Negative.  Negative for depression and memory loss. The patient is not nervous/anxious.          Patient Reported Health Risk Assessment  What [age]?: 65-69  Are you male or female?: Female  During the past four weeks, how much have you been bothered by emotional problems such as feeling anxious, depressed, irritable, sad, or downhearted and blue?: Not at all  During the past five weeks, has your physical and/or emotional health limited your social activities with family, friends, neighbors, or groups?: Not at all  During the past four weeks, how much bodily pain have you generally had?: No  pain  During the past four weeks, was someone available to help if you needed and wanted help?: Yes, as much as I wanted  During the past four weeks, what was the hardest physical activity you could do for at least two minutes?: Light  Can you get to places out of walking distance without help?  (For example, can you travel alone on buses or taxis, or drive your own car?): Yes  Can you go shopping for groceries or clothes without someone's help?: Yes  Can you prepare your own meals?: Yes  Can you do your own housework without help?: Yes  Because of any health problems, do you need the help of another person with your personal care needs such as eating, bathing, dressing, or getting around the house?: No  Can you handle your own money without help?: Yes  During the past four weeks, how would you rate your health in general?: Good  How have things been going for you during the past four weeks?: Pretty well  Are you having difficulties driving your car?: No  Do you always fasten your seat belt when you are in a car?: Yes, usually  How often in the past four weeks have you been bothered by falling or dizzy when standing up?: Never  How often in the past four weeks have you been bothered by sexual problems?: Never  How often in the past four weeks have you been bothered by trouble eating well?: Never  How often in the past four weeks have you been bothered by teeth or denture problems?: Never  How often in the past four weeks have you been bothered with problems using the telephone?: Never  How often in the past four weeks have you been bothered by tiredness or fatigue?: Never  Have you fallen two or more times in the past year?: No  Are you afraid of falling?: No  Are you a smoker?: No  During the past four weeks, how many drinks of wine, beer, or other alcoholic beverages did you have?: No alcohol at all  Do you exercise for about 20 minutes three or more days a week?: No, I usually do not exercise this much  Have you  "been given any information to help you with hazards in your house that might hurt you?: No  Have you been given any information to help you with keeping track of your medications?: No  How often do you have trouble taking medicines the way you've been told to take them?: I always take them as prescribed  How confident are you that you can control and manage most of your health problems?: Very confident  What is your race? (Check all that apply.):     Objective:     /81   Pulse 100   Resp 18   Ht 5' 6" (1.676 m)   Wt 107.5 kg (237 lb)   SpO2 97%   BMI 38.25 kg/m²     Physical Exam  Constitutional:       Appearance: She is obese.   HENT:      Head: Normocephalic.      Mouth/Throat:      Mouth: Mucous membranes are moist.      Pharynx: Oropharynx is clear.   Eyes:      Pupils: Pupils are equal, round, and reactive to light.   Cardiovascular:      Rate and Rhythm: Normal rate and regular rhythm.   Pulmonary:      Effort: Pulmonary effort is normal.      Breath sounds: Normal breath sounds.   Abdominal:      Palpations: Abdomen is soft.   Musculoskeletal:         General: Normal range of motion.   Skin:     General: Skin is warm and dry.   Neurological:      General: No focal deficit present.      Mental Status: She is alert.   Psychiatric:         Mood and Affect: Mood normal.         Behavior: Behavior normal.         Thought Content: Thought content normal.         Judgment: Judgment normal.         Lab Results   Component Value Date     (H) 11/20/2024    K 5.8 (H) 11/20/2024     11/20/2024    CO2 27 11/20/2024    BUN 18.5 11/20/2024    CREATININE 0.98 11/20/2024    CALCIUM 9.9 11/20/2024    ALBUMIN 4.3 11/20/2024    BILITOT 0.5 11/20/2024    ALKPHOS 65 11/20/2024    AST 15 11/20/2024    ALT 21 11/20/2024    EGFRNORACEVR >60 11/20/2024     Lab Results   Component Value Date    WBC 8.37 11/20/2024    RBC 4.45 11/20/2024    HGB 13.8 11/20/2024    HCT 40.8 11/20/2024    MCV 91.7 " 11/20/2024    RDW 14.3 11/20/2024     11/20/2024      Lab Results   Component Value Date    CHOL 139 11/20/2024    TRIG 145 (H) 11/20/2024    HDL 50 11/20/2024    LDL 60.00 11/20/2024    TOTALCHOLEST 3 11/20/2024     Lab Results   Component Value Date    TSH 1.572 11/20/2024     Lab Results   Component Value Date    HGBA1C 6.5 11/20/2024            No data to display                  12/3/2024     4:00 PM 10/2/2024     3:45 PM 10/23/2023     3:45 PM 9/25/2023     1:00 PM 8/11/2023    11:30 AM 3/16/2023    10:45 AM 11/30/2022     1:30 PM   Fall Risk Assessment - Outpatient   Mobility Status Ambulatory Ambulatory Ambulatory Ambulatory Ambulatory Ambulatory Ambulatory   Number of falls 0 0 0 0 0 0 0   Identified as fall risk False False False False False False False           Depression Screening  Over the past two weeks, has the patient felt down, depressed, or hopeless?: No  Over the past two weeks, has the patient felt little interest or pleasure in doing things?: No  Functional Ability/Safety Screening  Was the patient's timed Up & Go test unsteady or longer than 30 seconds?: No  Does the patient need help with phone, transportation, shopping, preparing meals, housework, laundry, meds, or managing money?: No  Does the patient's home have rugs in the hallway, lack grab bars in the bathroom, lack handrails on the stairs or have poor lighting?: No  Have you noticed any hearing difficulties?: No  Cognitive Function (Assessed through direct observation with due consideration of information obtained by way of patient reports and/or concerns raised by family, friends, caretakers, or others)    Does the patient repeat questions/statements in the same day?: No  Does the patient have trouble remembering the date, year, and time?: No  Does the patient have difficulty managing finances?: No  Does the patient have a decreased sense of direction?: No  Assessment:       ICD-10-CM ICD-9-CM   1. Medicare annual wellness  visit, subsequent  Z00.00 V70.0   2. Primary hypertension  I10 401.9   3. Type 2 diabetes mellitus without complication, without long-term current use of insulin  E11.9 250.00   4. Dyslipidemia  E78.5 272.4   5. Generalized anxiety disorder  F41.1 300.02   6. Mixed anxiety depressive disorder  F41.8 300.4   7. Osteoarthritis, unspecified osteoarthritis type, unspecified site  M19.90 715.90   8. Class 2 severe obesity due to excess calories with serious comorbidity and body mass index (BMI) of 38.0 to 38.9 in adult  E66.812 278.01    E66.01 V85.38    Z68.38    9. Postmenopausal  Z78.0 V49.81   10. Hyperkalemia  E87.5 276.7        Plan:     Medicare Annual Wellness and Personalized Prevention Plan:   Fall Risk + Home Safety + Hearing Impairment + Depression Screen + Cognitive Impairment Screen + Health Risk Assessment all reviewed.       Health Maintenance Topics with due status: Not Due       Topic Last Completion Date    Colorectal Cancer Screening 06/29/2023    Diabetes Urine Screening 04/25/2024    Low Dose Statin 10/02/2024    Lipid Panel 11/20/2024    Hemoglobin A1c 11/20/2024    Foot Exam 12/03/2024      The patient's Health Maintenance was reviewed and the following appears to be due at this time:   Health Maintenance Due   Topic Date Due    DEXA Scan  Never done    Influenza Vaccine (1) 09/01/2024     Health risk assessment:  After review of the patient's medical record as it relates to health risk assessment over the next 5-10 years per recommendations of the USPSTF, it was determined that all pertinent recommendations have been appropriately addressed. The patient does not desire any further preventative care measures or screening tests at this time.  We will continue to reassess at each annual visit.    1. Medicare annual wellness visit, subsequent  Comments:  Overall health status was reviewed.  Good health habits reinforced.  Annual wellness exams recommended.    2. Primary  hypertension  Overview:  Prescribed lisinopril 20 mg daily, Toprol- mg daily.    Blood pressures appear to be adequately controlled with current treatment plan, including prescription blood pressure medication.  Medication(s) appear to be effective at current dosages, and are not causing any side effects or problems.  Continue medical management and continue home blood pressure checks.  Average blood pressures at home should be no greater than 130/80.  Patient instructed to contact the clinic if blood pressures become elevated at any point prior to next clinic visit.    Medication will be continued at the current dosage.      3. Type 2 diabetes mellitus without complication, without long-term current use of insulin  Overview:  Patient prescribed metformin 1000 mg b.i.d., glipizide XL 10 mg daily, Ozempic 0.25 mg weekly.    Ozempic dose increase on 07/30/2024.    Most recent hemoglobin A1c shows good control on diabetic treatment plan, including diabetic prescription medication.  Continue current treatment plan, medical regimen, and lifestyle modification as discussed at this visit and during previous visits.    Medication will be continued at current dosage.    Assessment & Plan:             Component Ref Range & Units 12 d ago  (11/20/24) 4 mo ago  (7/24/24) 7 mo ago  (4/25/24) 1 yr ago  (10/11/23) 1 yr ago  (6/19/23) 1 yr ago  (2/20/23) 2 yr ago  (11/29/22)   Hemoglobin A1c <=7.0 % 6.5 6.7 8.0 High  7.3 High  8.7 High  8.6 High  8.5 High    Estimated Average Glucose mg/dL 139.9 145.6 182.9 162.8 203.0 200.1 197.3          Orders:  -     Hemoglobin A1C; Future; Expected date: 06/03/2025  -     Microalbumin/Creatinine Ratio, Urine; Future; Expected date: 06/03/2025    4. Dyslipidemia  Overview:  Prescribed Lipitor 20 mg daily.    Most recent cholesterol/lipid blood testing shows adequate control, continue current treatment plan, including prescription medications if prescribed, and/or diet high in fiber, low  in saturated fats as discussed during clinic visit.    Medication will be continued at current dosage.      5. Generalized anxiety disorder  Overview:  Prescribed lorazepam 0.5 mg q.6 hours. Infrequent use if at all.      6. Mixed anxiety depressive disorder  Overview:  Prescribed Effexor  mg daily.    Patient reports stability of moods, and effectiveness of medications, including no agitation, significant somnolence, or significant bouts of anxiety or depression.  Patient is not having any sleep disturbance.  Current treatment plan will continue, with plans to discuss any significant changes in patient's status if necessary if anything changes in the future..    Medication will be continued at current dosage.      7. Osteoarthritis, unspecified osteoarthritis type, unspecified site  Overview:  Patient has a diagnosis of osteoarthritis.  This is a chronic inflammatory joint condition.  Suggestions for treatment of this condition are:  -using medications as needed to control inflammation and pain, this would include either NSAID medications or Tylenol  -maintaining a good healthy amount of regular activity  -maintaining a healthy weight  -it may be helpful to start a supplement containing glucosamine, chondroitin, tumeric, and MSM (methylsulfonlmethane).  This is a dietary supplement which should have a positive effect on symptoms consistent with osteoarthritis  -in some instances, depending on the joint affected, intra-articular start injections can be helpful      8. Class 2 severe obesity due to excess calories with serious comorbidity and body mass index (BMI) of 38.0 to 38.9 in adult  Overview:  Patient continues to have a BMI that falls in the obesity range.    Weight loss, healthy dietary choices, increased activity/exercise are recommended.  To lose weight, it is generally recommended to restrict calories to approximately 1500 calories per day to lose 1 lb per week, and approximately 1200 calories per  "day to lose up to 2 lb per week.  Generally, this is a healthy amount of weight to lose, and an appropriate amount of time to lose this amount of weight.  Adding exercise will assist in losing weight, particularly aerobic exercise such as walking.  However, resistance training, or lifting weights" over time may assistant weight loss by increasing basal metabolic rate, or the rate at which your body burns calories during periods of inactivity.    Keep track of BMI (body mass index), below 25 is considered normal, over 25 but below 30 is considered overweight, anything over 30 is considered moderately obese, over 35 severely obese, and over 40 is characterized as morbidly obese.      9. Postmenopausal  Overview:  DEXA scan will be ordered.    Orders:  -     Cancel: DXA Bone Density Axial Skeleton 1 or more sites; Future; Expected date: 12/03/2024  -     DXA Bone Density Axial Skeleton 1 or more sites; Future; Expected date: 12/03/2024    10. Hyperkalemia  Overview:  It was decided that we would repeat the potassium level at the next visit, June 2025.  More than likely, this is falsely elevated due to hemolysis.    Orders:  -     Cancel: Basic Metabolic Panel; Future; Expected date: 06/03/2025  -     Basic Metabolic Panel; Future; Expected date: 06/03/2025            Advance Care Planning     Date: 12/02/2024    Living Will  During this visit, I engaged the patient  in the voluntary advance care planning process.  The patient and I reviewed the role for advance directives and their purpose in directing future healthcare if the patient's unable to speak for him/herself.  At this point in time, the patient does have full decision-making capacity.  We discussed different extreme health states that she could experience, and reviewed what kind of medical care she would want in those situations.  The patient communicated that if she were comatose and had little chance of a meaningful recovery, she would not want " machines/life-sustaining treatments used. In addition to the above preference, other important end-of-life issues for the patient include no other issues. The patient has completed a living will to reflect these preferences.  I spent a total of 5 minutes engaging the patient in this advance care planning discussion.              Current Outpatient Medications   Medication Instructions    atorvastatin (LIPITOR) 20 mg, Oral, Daily    glipiZIDE (GLUCOTROL) 10 mg, Oral, With breakfast    lisinopriL (PRINIVIL,ZESTRIL) 20 mg, Oral, Daily    LORazepam (ATIVAN) 0.5 mg, Oral, Every 6 hours PRN    meclizine (ANTIVERT) 25 mg, Oral, 3 times daily PRN    metFORMIN (GLUCOPHAGE) 1,000 mg, Oral, 2 times daily with meals    metoprolol succinate (TOPROL-XL) 100 mg, Oral, Daily    montelukast (SINGULAIR) 10 mg, Oral, Daily    semaglutide (OZEMPIC) 0.5 mg, Subcutaneous, Every 7 days    venlafaxine (EFFEXOR-XR) 150 mg, Oral, Daily        Follow up in about 6 months (around 6/4/2025) for Extended chronic condition F/up. In addition to their scheduled follow up, the patient has also been instructed to follow up on as needed basis.     This note was created with the assistance of [employer] voice recognition software or phone dictation. There may be transcription errors as a result of using this technology. Effort has been made to assure accuracy of transcription but any obvious errors or omissions should be clarified with the author of the document.

## 2024-12-03 NOTE — ASSESSMENT & PLAN NOTE
Component Ref Range & Units 12 d ago  (11/20/24) 4 mo ago  (7/24/24) 7 mo ago  (4/25/24) 1 yr ago  (10/11/23) 1 yr ago  (6/19/23) 1 yr ago  (2/20/23) 2 yr ago  (11/29/22)   Hemoglobin A1c <=7.0 % 6.5 6.7 8.0 High  7.3 High  8.7 High  8.6 High  8.5 High    Estimated Average Glucose mg/dL 139.9 145.6 182.9 162.8 203.0 200.1 197.3         Darcy KENYON with [facility] called the clinic.    1.  Patient currently has a G tube in place.  Patient is suppose to be NPO for everything except ice chips.    Patient has been taking metoprolol succinate ER (TOPROL XL) 25 MG 24 hr tablet and dutasteride (AVODART) 0.5 MG capsule orally.      metoprolol succinate ER (TOPROL XL) 25 MG 24 hr tablet: cannot be crushed as it is an extended release and capsule form.    Pharmacist recommendations: switch to Metoprolol tartrate (this will require large amounts to meet the current prescribed dose).    dutasteride (AVODART) 0.5 MG capsule: cannot be crushed as it is a gel capsule    Darcy was instructed by the home care agency and the family pharmacy to contact PCP for alternative medications that can be administered via the G-tube.    Please call Darcy KENYON with [facility] at [phone]

## 2024-12-19 LAB
LEFT EYE DM RETINOPATHY: NEGATIVE
RIGHT EYE DM RETINOPATHY: NEGATIVE

## 2024-12-23 ENCOUNTER — DOCUMENTATION ONLY (OUTPATIENT)
Dept: PRIMARY CARE CLINIC | Facility: CLINIC | Age: 69
End: 2024-12-23
Payer: MEDICARE

## 2025-03-10 ENCOUNTER — PATIENT MESSAGE (OUTPATIENT)
Dept: PRIMARY CARE CLINIC | Facility: CLINIC | Age: 70
End: 2025-03-10
Payer: MEDICARE

## 2025-03-10 DIAGNOSIS — F41.8 MIXED ANXIETY DEPRESSIVE DISORDER: ICD-10-CM

## 2025-03-10 DIAGNOSIS — F41.1 GENERALIZED ANXIETY DISORDER: Primary | Chronic | ICD-10-CM

## 2025-03-16 DIAGNOSIS — F41.1 GENERALIZED ANXIETY DISORDER: Chronic | ICD-10-CM

## 2025-03-17 RX ORDER — LORAZEPAM 0.5 MG/1
0.5 TABLET ORAL EVERY 6 HOURS PRN
Qty: 30 TABLET | Refills: 2 | Status: SHIPPED | OUTPATIENT
Start: 2025-03-17 | End: 2025-04-16

## 2025-03-25 DIAGNOSIS — J30.2 SEASONAL ALLERGIES: ICD-10-CM

## 2025-03-25 DIAGNOSIS — E78.5 HYPERLIPIDEMIA, UNSPECIFIED HYPERLIPIDEMIA TYPE: ICD-10-CM

## 2025-03-25 DIAGNOSIS — I10 PRIMARY HYPERTENSION: Chronic | ICD-10-CM

## 2025-03-25 RX ORDER — ATORVASTATIN CALCIUM 20 MG/1
20 TABLET, FILM COATED ORAL DAILY
Qty: 90 TABLET | Refills: 3 | Status: SHIPPED | OUTPATIENT
Start: 2025-03-25

## 2025-03-25 RX ORDER — MONTELUKAST SODIUM 10 MG/1
10 TABLET ORAL DAILY
Qty: 90 TABLET | Refills: 3 | Status: SHIPPED | OUTPATIENT
Start: 2025-03-25

## 2025-03-25 RX ORDER — LISINOPRIL 20 MG/1
20 TABLET ORAL DAILY
Qty: 90 TABLET | Refills: 3 | Status: SHIPPED | OUTPATIENT
Start: 2025-03-25

## 2025-05-12 DIAGNOSIS — I10 PRIMARY HYPERTENSION: ICD-10-CM

## 2025-05-12 RX ORDER — METOPROLOL SUCCINATE 100 MG/1
100 TABLET, EXTENDED RELEASE ORAL DAILY
Qty: 90 TABLET | Refills: 3 | Status: SHIPPED | OUTPATIENT
Start: 2025-05-12 | End: 2026-05-12

## 2025-05-19 DIAGNOSIS — E11.65 TYPE 2 DIABETES MELLITUS WITH HYPERGLYCEMIA, WITHOUT LONG-TERM CURRENT USE OF INSULIN: Chronic | ICD-10-CM

## 2025-05-19 DIAGNOSIS — R42 VERTIGO: Chronic | ICD-10-CM

## 2025-05-19 RX ORDER — MECLIZINE HYDROCHLORIDE 25 MG/1
25 TABLET ORAL 3 TIMES DAILY PRN
Qty: 30 TABLET | Refills: 5 | Status: SHIPPED | OUTPATIENT
Start: 2025-05-19 | End: 2025-11-15

## 2025-05-19 RX ORDER — METFORMIN HYDROCHLORIDE 1000 MG/1
1000 TABLET ORAL 2 TIMES DAILY WITH MEALS
Qty: 180 TABLET | Refills: 3 | Status: SHIPPED | OUTPATIENT
Start: 2025-05-19 | End: 2026-05-19

## 2025-05-23 DIAGNOSIS — F41.1 GENERALIZED ANXIETY DISORDER: Chronic | ICD-10-CM

## 2025-05-27 RX ORDER — LORAZEPAM 0.5 MG/1
0.5 TABLET ORAL EVERY 6 HOURS PRN
Qty: 30 TABLET | Refills: 2 | Status: SHIPPED | OUTPATIENT
Start: 2025-05-27 | End: 2025-06-26

## 2025-06-02 ENCOUNTER — CLINICAL SUPPORT (OUTPATIENT)
Dept: PRIMARY CARE CLINIC | Facility: CLINIC | Age: 70
End: 2025-06-02
Payer: MEDICARE

## 2025-06-02 ENCOUNTER — RESULTS FOLLOW-UP (OUTPATIENT)
Dept: PRIMARY CARE CLINIC | Facility: CLINIC | Age: 70
End: 2025-06-02

## 2025-06-02 DIAGNOSIS — E87.5 HYPERKALEMIA: ICD-10-CM

## 2025-06-02 DIAGNOSIS — F41.1 GENERALIZED ANXIETY DISORDER: Primary | ICD-10-CM

## 2025-06-02 DIAGNOSIS — E11.9 TYPE 2 DIABETES MELLITUS WITHOUT COMPLICATION, WITHOUT LONG-TERM CURRENT USE OF INSULIN: Chronic | ICD-10-CM

## 2025-06-02 PROBLEM — F41.8 MIXED ANXIETY DEPRESSIVE DISORDER: Chronic | Status: ACTIVE | Noted: 2023-06-21

## 2025-06-02 LAB
CREAT UR-MCNC: 63.5 MG/DL (ref 45–106)
EST. AVERAGE GLUCOSE BLD GHB EST-MCNC: 162.8 MG/DL
HBA1C MFR BLD: 7.3 %
MICROALBUMIN UR-MCNC: <5 UG/ML
MICROALBUMIN/CREAT RATIO PNL UR: NORMAL

## 2025-06-02 PROCEDURE — 36415 COLL VENOUS BLD VENIPUNCTURE: CPT | Mod: ,,, | Performed by: GENERAL PRACTICE

## 2025-06-02 PROCEDURE — 82570 ASSAY OF URINE CREATININE: CPT | Performed by: GENERAL PRACTICE

## 2025-06-02 PROCEDURE — 83036 HEMOGLOBIN GLYCOSYLATED A1C: CPT | Performed by: GENERAL PRACTICE

## 2025-06-02 RX ORDER — VENLAFAXINE HYDROCHLORIDE 150 MG/1
150 CAPSULE, EXTENDED RELEASE ORAL DAILY
Qty: 90 CAPSULE | Refills: 3 | Status: SHIPPED | OUTPATIENT
Start: 2025-06-02 | End: 2026-06-02

## 2025-06-04 ENCOUNTER — OFFICE VISIT (OUTPATIENT)
Dept: PRIMARY CARE CLINIC | Facility: CLINIC | Age: 70
End: 2025-06-04
Payer: MEDICARE

## 2025-06-04 VITALS
SYSTOLIC BLOOD PRESSURE: 123 MMHG | WEIGHT: 234 LBS | RESPIRATION RATE: 18 BRPM | DIASTOLIC BLOOD PRESSURE: 84 MMHG | HEIGHT: 66 IN | OXYGEN SATURATION: 97 % | HEART RATE: 86 BPM | BODY MASS INDEX: 37.61 KG/M2

## 2025-06-04 DIAGNOSIS — F32.0 MAJOR DEPRESSIVE DISORDER, SINGLE EPISODE, MILD: ICD-10-CM

## 2025-06-04 DIAGNOSIS — F41.1 GAD (GENERALIZED ANXIETY DISORDER): ICD-10-CM

## 2025-06-04 DIAGNOSIS — E66.01 CLASS 2 SEVERE OBESITY DUE TO EXCESS CALORIES WITH SERIOUS COMORBIDITY AND BODY MASS INDEX (BMI) OF 37.0 TO 37.9 IN ADULT: Chronic | ICD-10-CM

## 2025-06-04 DIAGNOSIS — E66.812 CLASS 2 SEVERE OBESITY DUE TO EXCESS CALORIES WITH SERIOUS COMORBIDITY AND BODY MASS INDEX (BMI) OF 37.0 TO 37.9 IN ADULT: Chronic | ICD-10-CM

## 2025-06-04 DIAGNOSIS — I10 PRIMARY HYPERTENSION: Primary | Chronic | ICD-10-CM

## 2025-06-04 DIAGNOSIS — M19.90 OSTEOARTHRITIS, UNSPECIFIED OSTEOARTHRITIS TYPE, UNSPECIFIED SITE: Chronic | ICD-10-CM

## 2025-06-04 DIAGNOSIS — E11.65 TYPE 2 DIABETES MELLITUS WITH HYPERGLYCEMIA, WITHOUT LONG-TERM CURRENT USE OF INSULIN: Chronic | ICD-10-CM

## 2025-06-04 DIAGNOSIS — E78.5 DYSLIPIDEMIA: Chronic | ICD-10-CM

## 2025-06-04 PROBLEM — F41.8 MIXED ANXIETY DEPRESSIVE DISORDER: Chronic | Status: RESOLVED | Noted: 2023-06-21 | Resolved: 2025-06-04

## 2025-06-04 RX ORDER — KETOCONAZOLE 20 MG/ML
1 SHAMPOO, SUSPENSION TOPICAL
COMMUNITY
Start: 2025-02-13

## 2025-06-04 RX ORDER — HYDROCORTISONE 25 MG/G
1 CREAM TOPICAL 2 TIMES DAILY
COMMUNITY
Start: 2025-02-13

## 2025-06-04 RX ORDER — VENLAFAXINE HYDROCHLORIDE 37.5 MG/1
37.5 CAPSULE, EXTENDED RELEASE ORAL DAILY
Qty: 90 CAPSULE | Refills: 3 | Status: SHIPPED | OUTPATIENT
Start: 2025-06-04 | End: 2026-06-04

## 2025-06-04 RX ORDER — VENLAFAXINE HYDROCHLORIDE 37.5 MG/1
37.5 CAPSULE, EXTENDED RELEASE ORAL DAILY
Qty: 90 CAPSULE | Refills: 3 | Status: SHIPPED | OUTPATIENT
Start: 2025-06-04 | End: 2025-06-04

## 2025-06-04 RX ORDER — KETOCONAZOLE 20 MG/G
1 CREAM TOPICAL 2 TIMES DAILY
COMMUNITY
Start: 2025-02-17

## 2025-06-12 DIAGNOSIS — E78.5 HYPERLIPIDEMIA, UNSPECIFIED HYPERLIPIDEMIA TYPE: Primary | ICD-10-CM

## 2025-06-12 RX ORDER — GLIPIZIDE 10 MG/1
10 TABLET, FILM COATED, EXTENDED RELEASE ORAL
Qty: 90 TABLET | Refills: 3 | Status: SHIPPED | OUTPATIENT
Start: 2025-06-12 | End: 2026-06-12

## 2025-07-16 ENCOUNTER — PATIENT MESSAGE (OUTPATIENT)
Dept: PRIMARY CARE CLINIC | Facility: CLINIC | Age: 70
End: 2025-07-16
Payer: MEDICARE

## 2025-07-17 ENCOUNTER — PATIENT MESSAGE (OUTPATIENT)
Dept: ORTHOPEDICS | Facility: CLINIC | Age: 70
End: 2025-07-17
Payer: MEDICARE

## 2025-07-17 ENCOUNTER — OFFICE VISIT (OUTPATIENT)
Dept: PRIMARY CARE CLINIC | Facility: CLINIC | Age: 70
End: 2025-07-17
Payer: MEDICARE

## 2025-07-17 ENCOUNTER — LAB VISIT (OUTPATIENT)
Dept: LAB | Facility: HOSPITAL | Age: 70
End: 2025-07-17
Attending: NURSE PRACTITIONER
Payer: MEDICARE

## 2025-07-17 VITALS
WEIGHT: 234 LBS | HEART RATE: 96 BPM | SYSTOLIC BLOOD PRESSURE: 131 MMHG | DIASTOLIC BLOOD PRESSURE: 61 MMHG | BODY MASS INDEX: 37.61 KG/M2 | HEIGHT: 66 IN

## 2025-07-17 DIAGNOSIS — M25.552 LEFT HIP PAIN: Primary | ICD-10-CM

## 2025-07-17 DIAGNOSIS — M19.90 OSTEOARTHRITIS, UNSPECIFIED OSTEOARTHRITIS TYPE, UNSPECIFIED SITE: Chronic | ICD-10-CM

## 2025-07-17 DIAGNOSIS — E11.65 TYPE 2 DIABETES MELLITUS WITH HYPERGLYCEMIA, WITHOUT LONG-TERM CURRENT USE OF INSULIN: Chronic | ICD-10-CM

## 2025-07-17 DIAGNOSIS — M25.552 LEFT HIP PAIN: ICD-10-CM

## 2025-07-17 LAB
ANION GAP SERPL CALC-SCNC: 11 MEQ/L
BUN SERPL-MCNC: 10.7 MG/DL (ref 9.8–20.1)
CALCIUM SERPL-MCNC: 9.3 MG/DL (ref 8.4–10.2)
CHLORIDE SERPL-SCNC: 107 MMOL/L (ref 98–107)
CO2 SERPL-SCNC: 25 MMOL/L (ref 23–31)
CREAT SERPL-MCNC: 0.88 MG/DL (ref 0.55–1.02)
CREAT/UREA NIT SERPL: 12
GFR SERPLBLD CREATININE-BSD FMLA CKD-EPI: >60 ML/MIN/1.73/M2
GLUCOSE SERPL-MCNC: 148 MG/DL (ref 82–115)
POTASSIUM SERPL-SCNC: 4.1 MMOL/L (ref 3.5–5.1)
SODIUM SERPL-SCNC: 143 MMOL/L (ref 136–145)

## 2025-07-17 PROCEDURE — 36415 COLL VENOUS BLD VENIPUNCTURE: CPT

## 2025-07-17 PROCEDURE — 99214 OFFICE O/P EST MOD 30 MIN: CPT | Mod: ,,, | Performed by: NURSE PRACTITIONER

## 2025-07-17 PROCEDURE — 80048 BASIC METABOLIC PNL TOTAL CA: CPT

## 2025-07-17 RX ORDER — MELOXICAM 15 MG/1
15 TABLET ORAL DAILY
Qty: 30 TABLET | Refills: 2 | Status: SHIPPED | OUTPATIENT
Start: 2025-07-17

## 2025-07-17 NOTE — PROGRESS NOTES
Internal Medicine    Patient ID: [id number]     Chief Complaint: Hip Pain (Left hip pain, no injury occurred )    HPI:     Lola Arana is a 70 y.o. female here today for a clinic visit.     History of Present Illness    CHIEF COMPLAINT:  Patient presents today for left hip pain.    HISTORY OF PRESENT ILLNESS:  She reports left hip pain starting over three weeks ago without any preceding fall, accident, or trauma. The pain is localized to the lateral hip region and is exacerbated with rotation and leg movement. Pain improves with rest. She can lay on left side with pillow support. She denies associated numbness or tingling down the leg. She reports overusing the leg for approximately 20 years due to previous knee injury. She is currently unable to perform previous sitting and leg positioning movements due to pain.    MEDICAL HISTORY:  She has diabetes managed with Ozempic, recently increased to 0.5 mg for blood sugar control and weight management. She has bilateral carpal tunnel syndrome, with initial surgery approximately 36 years ago and a second procedure performed 4 years ago by Dr. Apley. She currently has significant hand arthritis in her dominant hand and is scheduled to see Dr. Duran next month to discuss potential hand fusion surgery. She underwent right knee replacement over 16 years ago due to a work-related injury, resulting in significant leg weakness and difficulty with stairs.    WEIGHT MANAGEMENT:  She reports unintentional weight loss of 10 lbs since October, attributed to Ozempic prescribed for elevated A1C. Weight loss occurred without specific diet or exercise interventions.    MENTAL HEALTH:  She is experiencing ongoing grief following her 's death from colon cancer in April and is attending grief counseling with a psychologist.    FAMILY HISTORY:  She denies family history of diabetes.          Past Medical History:   Diagnosis Date    Anxiety disorder, unspecified     Arthritis      Diabetes mellitus, type 2     Fractures     HLD (hyperlipidemia)     Hypertension     Palpitations     Respiratory distress         Past Surgical History:   Procedure Laterality Date    ANTERIOR CERVICAL DISCECTOMY W/ FUSION  10/16/2009    C5-6, C6-7 ACDF.  Dr. Clemens    CARPAL TUNNEL RELEASE Left 2021    Dr. Barone    CARPAL TUNNEL RELEASE Right      SECTION      CHOLECYSTECTOMY  1999    EYE SURGERY  May 2023    Cataract    FOOT SURGERY      HERNIA REPAIR      JOINT REPLACEMENT  2009    Right TK    KNEE ARTHROSCOPY      KNEE SURGERY      LAPAROSCOPIC GASTRIC BANDING      PHACOEMULSIFICATION, CATARACT, WITH IOL INSERTION Right 2023    Procedure: PHACOEMULSIFICATION, CATARACT, WITH IOL INSERTION, EYHANCE- OD;  Surgeon: Edward Quiroz MD;  Location: [facility] OR;  Service: Ophthalmology;  Laterality: Right;    PHACOEMULSIFICATION, CATARACT, WITH IOL INSERTION Left 2023    Procedure: PHACOEMULSIFICATION, CATARACT, WITH IOL INSERTION- OS;  Surgeon: Edward Quiroz MD;  Location: [facility] OR;  Service: Ophthalmology;  Laterality: Left;    SPINE SURGERY  2009    ACF Right        Social History     Tobacco Use    Smoking status: Never     Passive exposure: Never    Smokeless tobacco: Never    Tobacco comments:     Never   Substance and Sexual Activity    Alcohol use: Not Currently    Drug use: Not Currently    Sexual activity: Not Currently     Partners: Male     Birth control/protection: None        Current Outpatient Medications   Medication Instructions    atorvastatin (LIPITOR) 20 mg, Oral, Daily    glipiZIDE (GLUCOTROL) 10 mg, Oral, With breakfast    hydrocortisone 2.5 % cream 1 application , 2 times daily    ketoconazole (NIZORAL) 2 % shampoo 1 application , Twice weekly    ketoconazole (NIZORAL) 1 g, 2 times daily    lisinopriL (PRINIVIL,ZESTRIL) 20 mg, Oral, Daily    LORazepam (ATIVAN) 0.5 mg, Oral, Every 6 hours PRN    meclizine (ANTIVERT) 25 mg, Oral, 3 times daily PRN     "meloxicam (MOBIC) 15 mg, Oral, Daily    metFORMIN (GLUCOPHAGE) 1,000 mg, Oral, 2 times daily with meals    metoprolol succinate (TOPROL-XL) 100 mg, Oral, Daily    montelukast (SINGULAIR) 10 mg, Oral, Daily    semaglutide (OZEMPIC) 0.5 mg, Subcutaneous, Every 7 days    venlafaxine (EFFEXOR XR) 37.5 mg, Oral, Daily    venlafaxine (EFFEXOR-XR) 150 mg, Oral, Daily       Review of patient's allergies indicates:  No Known Allergies     Patient Care Team:  Eder Combs MD as PCP - General (Family Medicine)  Rogers Cline DPM as Consulting Physician (Podiatry)  Tara Felipe MD as Obstetrician (Obstetrics)  Elton Vega MD (Dermatology)  Edward Quiroz MD as Consulting Physician (Ophthalmology)  Yaakov Drew DPM as Consulting Physician (Podiatry)  Tom Barone MD as Consulting Physician (Neurosurgery)  Joselo Dye Mammography -     Subjective:     Review of Systems    12 point review of systems conducted, negative except as stated in the history of present illness. See HPI for details.    Objective:     Visit Vitals  /61   Pulse 96   Ht 5' 6" (1.676 m)   Wt 106.1 kg (234 lb)   BMI 37.77 kg/m²       Physical Exam    Physical Exam    General: No acute distress. Well-developed. Well-nourished.  Eyes: EOMI. Sclerae anicteric.  HENT: Normocephalic. Atraumatic. Nares patent. Moist oral mucosa.  Cardiovascular: Regular rate. Regular rhythm. No murmurs. No rubs. No gallops. Normal S1, S2.  Respiratory: Normal respiratory effort. Clear to auscultation bilaterally. No rales. No rhonchi. No wheezing.  Musculoskeletal: No  obvious deformity.  Extremities: No lower extremity edema.  Neurological: Alert & oriented x3. No slurred speech. Normal gait.  Psychiatric: Normal mood. Normal affect. Good insight. Good judgment.  Skin: Warm. Dry. No rash.  MSK: Spine - Hip: No tenderness along hip joint.         Labs Reviewed:     Chemistry:  Lab Results   Component Value Date     (H) 11/20/2024    K 5.8 " (H) 11/20/2024    BUN 18.5 11/20/2024    CREATININE 0.98 11/20/2024    EGFRNORACEVR >60 11/20/2024    CALCIUM 9.9 11/20/2024    ALKPHOS 65 11/20/2024    ALBUMIN 4.3 11/20/2024    AST 15 11/20/2024    ALT 21 11/20/2024    TSH 1.572 11/20/2024        Lab Results   Component Value Date    HGBA1C 7.3 (H) 06/02/2025        Hematology:  Lab Results   Component Value Date    WBC 8.37 11/20/2024    HGB 13.8 11/20/2024    HCT 40.8 11/20/2024     11/20/2024       Lipid Panel:  Lab Results   Component Value Date    CHOL 139 11/20/2024    HDL 50 11/20/2024    LDL 60.00 11/20/2024    TRIG 145 (H) 11/20/2024    TOTALCHOLEST 3 11/20/2024    LDLCALC 50 02/10/2021        Urine:  Lab Results   Component Value Date    CREATRANDUR 63.5 06/02/2025        Assessment/Plan:     Assessment & Plan    PLAN SUMMARY:  - Order x-ray of left hip to evaluate for bone-on-bone changes and assess arthritis  - Order BMP to check potassium levels  - Prescribe meloxicam 15 mg daily, pending normal potassium levels and normal renal function on BMP  - Discontinue ibuprofen, Advil, or Aleve while taking meloxicam  - Continue Ozempic 0.5 mg for diabetes management; discussed potential dosage increase for added weight loss benefit  - Continue current psychological and grief counseling  - Schedule appointment with Dr. Duran for hand surgery consultation  - Follow-up to review BMP and x-ray results, and adjust treatment plan accordingly    TYPE 2 DIABETES MELLITUS:  - Patient is currently on Ozempic 0.5 mg for diabetes management, which was initiated due to elevated A1C.  - Discussed increasing Ozempic dosage to improve diabetes control.  - Patient reports 10 lbs of unintentional weight loss since October, likely attributed to Ozempic use.  - Ordered BMP (Basic Metabolic Panel) to check potassium levels.    LEFT HIP PAIN:  - Patient reports left hip pain for over 3 weeks, exacerbated by movement and rotation.  - Pain is localized in the left hip with no  associated numbness or tingling down the leg.  - Symptoms are consistent with arthritic changes.  - Ordered x-ray of left hip to evaluate for potential bone-on-bone changes and assess arthritis.  - Prescribed meloxicam 15 mg daily, pending normal potassium levels on BMP.  - Instructed patient not to take meloxicam with ibuprofen, Advil, or Aleve; can be taken with Tylenol.    OSTEOARTHRITIS OF RIGHT HAND:  - Patient reports significant arthritis in the right hand, causing pain and weakness.  - Physical exam shows tell-tale signs of arthritis in the palms.  - Discussed potential hand surgery and scheduled appointment with Dr. Barbour for hand surgery consultation.    RIGHT ARTIFICIAL KNEE JOINT:  - Patient had right knee replaced over 16 years ago due to injury and reports no strength in legs post knee replacement.    ADJUSTMENT DISORDER WITH DEPRESSED MOOD:  - Patient is still grieving 's death in April from colon cancer.  - Currently seeing both a psychologist and grief counselor for emotional support.  - Patient reports additional emotional distress following daughter's breakdown.  - Acknowledged the relationship between grief and health management.  - Will continue to support patient's ongoing psychological care.    GENERAL HEALTH MANAGEMENT:  - Discussed the impact of weight on joint health.          No follow-ups on file. In addition to their scheduled follow up, the patient has also been instructed to follow up on as needed basis.     Future Appointments   Date Time Provider Department Center   8/6/2025  1:45 PM Kieran Duran Jr., MD [facility] [place]   12/4/2025  7:45 AM NURSE, [facility] PRIMARY CARE [facility]   12/8/2025 11:00 AM Eder Combs MD [facility] [place]viere, FNP    This note was generated with the assistance of ambient listening technology. Verbal consent was obtained by the patient and accompanying visitor(s) for the recording of patient  appointment to facilitate this note. I attest to having reviewed and edited the generated note for accuracy, though some syntax or spelling errors may persist. Please contact the author of this note for any clarification.

## 2025-07-18 ENCOUNTER — TELEPHONE (OUTPATIENT)
Dept: PRIMARY CARE CLINIC | Facility: CLINIC | Age: 70
End: 2025-07-18
Payer: MEDICARE

## 2025-07-18 NOTE — TELEPHONE ENCOUNTER
----- Message from PAOLA Simon sent at 7/18/2025  7:55 AM CDT -----  Please inform patient of results.    1. Potassium normalized. Kidney function normal. Start meloxicam as instructed.    2. Hip XR shows moderate arthritis - follow up with ortho as planned.     Thanks for all you do,    Isma    ----- Message -----  From: Lab, Background User  Sent: 7/17/2025   6:16 PM CDT  To: PAOLA Cameron

## 2025-08-06 ENCOUNTER — HOSPITAL ENCOUNTER (OUTPATIENT)
Dept: RADIOLOGY | Facility: CLINIC | Age: 70
Discharge: HOME | End: 2025-08-06
Attending: ORTHOPAEDIC SURGERY
Payer: MEDICARE

## 2025-08-06 ENCOUNTER — OFFICE VISIT (OUTPATIENT)
Dept: ORTHOPEDICS | Facility: CLINIC | Age: 70
End: 2025-08-06
Payer: MEDICARE

## 2025-08-06 VITALS
BODY MASS INDEX: 37.6 KG/M2 | DIASTOLIC BLOOD PRESSURE: 65 MMHG | HEART RATE: 97 BPM | SYSTOLIC BLOOD PRESSURE: 129 MMHG | HEIGHT: 66 IN | WEIGHT: 233.94 LBS

## 2025-08-06 DIAGNOSIS — M18.11 ARTHRITIS OF CARPOMETACARPAL (CMC) JOINT OF RIGHT THUMB: ICD-10-CM

## 2025-08-06 DIAGNOSIS — M18.12 ARTHRITIS OF CARPOMETACARPAL (CMC) JOINT OF LEFT THUMB: ICD-10-CM

## 2025-08-06 DIAGNOSIS — M18.12 ARTHRITIS OF CARPOMETACARPAL (CMC) JOINT OF LEFT THUMB: Primary | ICD-10-CM

## 2025-08-06 DIAGNOSIS — M79.641 RIGHT HAND PAIN: ICD-10-CM

## 2025-08-06 PROCEDURE — 73130 X-RAY EXAM OF HAND: CPT | Mod: LT,,, | Performed by: ORTHOPAEDIC SURGERY

## 2025-08-06 PROCEDURE — 99214 OFFICE O/P EST MOD 30 MIN: CPT | Mod: ,,, | Performed by: ORTHOPAEDIC SURGERY

## 2025-08-06 RX ORDER — SEMAGLUTIDE 0.68 MG/ML
0.5 INJECTION, SOLUTION SUBCUTANEOUS
COMMUNITY

## 2025-08-06 RX ORDER — SODIUM CHLORIDE 9 MG/ML
INJECTION, SOLUTION INTRAVENOUS CONTINUOUS
OUTPATIENT
Start: 2025-08-06

## 2025-08-06 NOTE — PROGRESS NOTES
Chief Complaint:   Chief Complaint   Patient presents with    Left Wrist - Pre-op Exam     Here to discuss surgery for the left wrist        Consulting Physician: No ref. provider found    History of present illness:  She is a pleasant 66-year-old who returns with left thumb pain due to known CMC arthritis. Her pain is along the CMC joint. Worse with any activity and is now constant. Not much better at rest. She has tried bracing and OTC medications without relief.     She returns today.  Her wrist and base of the thumb pain is unchanged.  She has also noticed some right-sided symptoms.    Past Medical History:   Diagnosis Date    Anxiety disorder, unspecified     Arthritis     Diabetes mellitus, type 2     Fractures     HLD (hyperlipidemia)     Hypertension     Palpitations     Respiratory distress        Past Surgical History:   Procedure Laterality Date    ANTERIOR CERVICAL DISCECTOMY W/ FUSION  10/16/2009    C5-6, C6-7 ACDF.  Dr. Clemens    CARPAL TUNNEL RELEASE Left 2021    Dr. Barone    CARPAL TUNNEL RELEASE Right      SECTION      CHOLECYSTECTOMY  1999    EYE SURGERY  May 2023    Cataract    FOOT SURGERY      HERNIA REPAIR      JOINT REPLACEMENT  2009    Right TK    KNEE ARTHROSCOPY      KNEE SURGERY      LAPAROSCOPIC GASTRIC BANDING      PHACOEMULSIFICATION, CATARACT, WITH IOL INSERTION Right 2023    Procedure: PHACOEMULSIFICATION, CATARACT, WITH IOL INSERTION, EYHANCE- OD;  Surgeon: Edward Quiroz MD;  Location: [facility] OR;  Service: Ophthalmology;  Laterality: Right;    PHACOEMULSIFICATION, CATARACT, WITH IOL INSERTION Left 2023    Procedure: PHACOEMULSIFICATION, CATARACT, WITH IOL INSERTION- OS;  Surgeon: Edward Quiroz MD;  Location: [facility] OR;  Service: Ophthalmology;  Laterality: Left;    SPINE SURGERY  2009    ACF Right       Current Outpatient Medications   Medication Sig    atorvastatin (LIPITOR) 20 MG tablet Take 1 tablet (20 mg total) by mouth once  daily.    glipiZIDE (GLUCOTROL) 10 MG TR24 Take 1 tablet (10 mg total) by mouth daily with breakfast.    ketoconazole (NIZORAL) 2 % cream Apply 1 g topically 2 (two) times daily.    ketoconazole (NIZORAL) 2 % shampoo Apply 1 application  topically twice a week.    lisinopriL (PRINIVIL,ZESTRIL) 20 MG tablet Take 1 tablet (20 mg total) by mouth once daily.    LORazepam (ATIVAN) 0.5 MG tablet Take 1 tablet (0.5 mg total) by mouth every 6 (six) hours as needed for Anxiety.    meclizine (ANTIVERT) 25 mg tablet Take 1 tablet (25 mg total) by mouth 3 (three) times daily as needed for Dizziness.    meloxicam (MOBIC) 15 MG tablet Take 1 tablet (15 mg total) by mouth once daily.    metFORMIN (GLUCOPHAGE) 1000 MG tablet Take 1 tablet (1,000 mg total) by mouth 2 (two) times daily with meals.    metoprolol succinate (TOPROL-XL) 100 MG 24 hr tablet Take 1 tablet (100 mg total) by mouth once daily.    montelukast (SINGULAIR) 10 mg tablet Take 1 tablet (10 mg total) by mouth once daily.    semaglutide (OZEMPIC) 0.25 mg or 0.5 mg (2 mg/3 mL) pen injector Inject 0.5 mg into the skin every 7 days.    venlafaxine (EFFEXOR XR) 37.5 MG 24 hr capsule Take 1 capsule (37.5 mg total) by mouth once daily.    venlafaxine (EFFEXOR-XR) 150 MG Cp24 Take 1 capsule (150 mg total) by mouth once daily.    hydrocortisone 2.5 % cream Apply 1 application  topically 2 (two) times daily.     No current facility-administered medications for this visit.       Review of patient's allergies indicates:  No Known Allergies    Family History   Problem Relation Name Age of Onset    Arthritis Mother Same     Cancer Mother Same         Breast and cervical. 80y/o    Hypertension Mother Same     COPD Father Reid Villafuerte        Social History     Socioeconomic History    Marital status:    Tobacco Use    Smoking status: Never     Passive exposure: Never    Smokeless tobacco: Never    Tobacco comments:     Never   Substance and Sexual Activity    Alcohol use:  "Not Currently    Drug use: Not Currently    Sexual activity: Not Currently     Partners: Male     Birth control/protection: None     Social Drivers of Health     Financial Resource Strain: Low Risk  (7/16/2025)    Overall Financial Resource Strain (CARDIA)     Difficulty of Paying Living Expenses: Not hard at all   Food Insecurity: No Food Insecurity (7/16/2025)    Hunger Vital Sign     Worried About Running Out of Food in the Last Year: Never true     Ran Out of Food in the Last Year: Never true   Transportation Needs: No Transportation Needs (7/16/2025)    PRAPARE - Transportation     Lack of Transportation (Medical): No     Lack of Transportation (Non-Medical): No   Physical Activity: Unknown (7/16/2025)    Exercise Vital Sign     Days of Exercise per Week: 0 days   Stress: Stress Concern Present (7/16/2025)    [employer] - Occupational Stress Questionnaire     Feeling of Stress : Very much   Housing Stability: Low Risk  (7/16/2025)    Housing Stability Vital Sign     Unable to Pay for Housing in the Last Year: No     Number of Times Moved in the Last Year: 0     Homeless in the Last Year: No       Review of Systems:    Constitution:   Denies chills, fever, and sweats.  HENT:   Denies headaches or blurry vision.  Cardiovascular:  Denies chest pain or irregular heart beat.  Respiratory:   Denies cough or shortness of breath.  Gastrointestinal:  Denies abdominal pain, nausea, or vomiting.  Musculoskeletal:   Denies muscle cramps.  Neurological:   Denies dizziness or focal weakness.  Psychiatric/Behavior: Normal mental status.  Hematology/Lymph:  Denies bleeding problem or easy bruising/bleeding.  Skin:    Denies rash or suspicious lesions.    Examination:    Vital Signs:    Vitals:    08/06/25 1406   BP: 129/65   Pulse: 97   Weight: 106.1 kg (233 lb 14.5 oz)   Height: 5' 6" (1.676 m)       Body mass index is 37.75 kg/m².    Constitution:   Well-developed, well nourished patient in no " acute distress.  Neurological:   Alert and oriented x 3 and cooperative to examination.     Psychiatric/Behavior: Normal mental status.  Respiratory:   No shortness of breath.  Eyes:    Extraoccular muscles intact  Skin:    No scars, rash or suspicious lesions.    MSK:   Left thumb without obvious deformity. ROM full. Distally neurovascular intact. Nontender to palpation. Positive grind test.     Imaging: X-rays ordered and images interpreted today personally by me of 3 views of her left hand/thumb show arthritis of the CMC joint.  Three views of the right hand show mild-to-moderate CMC arthritis       Assessment: Arthritis of carpometacarpal (CMC) joint of left thumb  -     X-Ray Hand Complete Left; Future; Expected date: 08/06/2025    Arthritis of carpometacarpal (CMC) joint of right thumb  -     X-Ray Hand 3 view Right; Future; Expected date: 08/06/2025        Plan:  I think her best option would be a left thumb CMC arthroplasty.  We discussed the details of the procedure and expected postoperative course.  We discussed the benefits of surgery which be to decrease her pain and increase her function.  We discussed the risks of surgery which is small but could be significant if she has pain, stiffness, or infection.  After discussion she would like to proceed.  After discussion she would like to proceed.  Plans for surgery September 4

## 2025-08-19 ENCOUNTER — LAB VISIT (OUTPATIENT)
Dept: LAB | Facility: HOSPITAL | Age: 70
End: 2025-08-19
Attending: ORTHOPAEDIC SURGERY
Payer: MEDICARE

## 2025-08-19 DIAGNOSIS — M18.12 ARTHRITIS OF CARPOMETACARPAL (CMC) JOINT OF LEFT THUMB: ICD-10-CM

## 2025-08-19 PROCEDURE — 93010 ELECTROCARDIOGRAM REPORT: CPT | Mod: ,,, | Performed by: INTERNAL MEDICINE

## 2025-08-19 PROCEDURE — 93005 ELECTROCARDIOGRAM TRACING: CPT

## 2025-08-20 LAB
OHS QRS DURATION: 82 MS
OHS QTC CALCULATION: 436 MS

## 2025-08-23 ENCOUNTER — PATIENT MESSAGE (OUTPATIENT)
Dept: RESEARCH | Facility: HOSPITAL | Age: 70
End: 2025-08-23
Payer: MEDICARE

## 2025-08-26 ENCOUNTER — ANESTHESIA EVENT (OUTPATIENT)
Dept: SURGERY | Facility: HOSPITAL | Age: 70
End: 2025-08-26
Payer: MEDICARE

## 2025-08-28 DIAGNOSIS — F41.1 GENERALIZED ANXIETY DISORDER: Chronic | ICD-10-CM

## 2025-08-29 RX ORDER — LORAZEPAM 0.5 MG/1
0.5 TABLET ORAL EVERY 6 HOURS PRN
Qty: 30 TABLET | Refills: 2 | Status: SHIPPED | OUTPATIENT
Start: 2025-08-29 | End: 2025-09-28

## 2025-09-04 ENCOUNTER — ANESTHESIA (OUTPATIENT)
Dept: SURGERY | Facility: HOSPITAL | Age: 70
End: 2025-09-04
Payer: MEDICARE

## 2025-09-04 PROCEDURE — 63600175 PHARM REV CODE 636 W HCPCS: Performed by: ANESTHESIOLOGY

## 2025-09-04 PROCEDURE — 63600175 PHARM REV CODE 636 W HCPCS: Performed by: ORTHOPAEDIC SURGERY

## 2025-09-04 PROCEDURE — 63600175 PHARM REV CODE 636 W HCPCS: Performed by: NURSE ANESTHETIST, CERTIFIED REGISTERED

## 2025-09-04 PROCEDURE — 64415 NJX AA&/STRD BRCH PLXS IMG: CPT | Performed by: ANESTHESIOLOGY

## 2025-09-04 PROCEDURE — 25000003 PHARM REV CODE 250: Performed by: ORTHOPAEDIC SURGERY

## 2025-09-04 PROCEDURE — 25000003 PHARM REV CODE 250: Performed by: NURSE ANESTHETIST, CERTIFIED REGISTERED

## 2025-09-04 RX ORDER — PROPOFOL 10 MG/ML
VIAL (ML) INTRAVENOUS CONTINUOUS PRN
Status: DISCONTINUED | OUTPATIENT
Start: 2025-09-04 | End: 2025-09-04

## 2025-09-04 RX ORDER — PROPOFOL 10 MG/ML
VIAL (ML) INTRAVENOUS
Status: DISCONTINUED | OUTPATIENT
Start: 2025-09-04 | End: 2025-09-04

## 2025-09-04 RX ORDER — DEXAMETHASONE SODIUM PHOSPHATE 4 MG/ML
INJECTION, SOLUTION INTRA-ARTICULAR; INTRALESIONAL; INTRAMUSCULAR; INTRAVENOUS; SOFT TISSUE
Status: DISCONTINUED | OUTPATIENT
Start: 2025-09-04 | End: 2025-09-04

## 2025-09-04 RX ORDER — FENTANYL CITRATE 50 UG/ML
INJECTION, SOLUTION INTRAMUSCULAR; INTRAVENOUS
Status: DISCONTINUED | OUTPATIENT
Start: 2025-09-04 | End: 2025-09-04

## 2025-09-04 RX ORDER — LIDOCAINE HYDROCHLORIDE 10 MG/ML
INJECTION, SOLUTION EPIDURAL; INFILTRATION; INTRACAUDAL; PERINEURAL
Status: DISCONTINUED | OUTPATIENT
Start: 2025-09-04 | End: 2025-09-04

## 2025-09-04 RX ORDER — PHENYLEPHRINE HYDROCHLORIDE 10 MG/ML
INJECTION INTRAVENOUS
Status: DISCONTINUED | OUTPATIENT
Start: 2025-09-04 | End: 2025-09-04

## 2025-09-04 RX ORDER — ROPIVACAINE HYDROCHLORIDE 5 MG/ML
INJECTION, SOLUTION EPIDURAL; INFILTRATION; PERINEURAL
Status: COMPLETED | OUTPATIENT
Start: 2025-09-04 | End: 2025-09-04

## 2025-09-04 RX ORDER — ONDANSETRON HYDROCHLORIDE 2 MG/ML
INJECTION, SOLUTION INTRAVENOUS
Status: DISCONTINUED | OUTPATIENT
Start: 2025-09-04 | End: 2025-09-04

## 2025-09-04 RX ADMIN — PHENYLEPHRINE HYDROCHLORIDE 100 MCG: 10 INJECTION INTRAVENOUS at 10:09

## 2025-09-04 RX ADMIN — DEXAMETHASONE SODIUM PHOSPHATE 4 MG: 4 INJECTION, SOLUTION INTRA-ARTICULAR; INTRALESIONAL; INTRAMUSCULAR; INTRAVENOUS; SOFT TISSUE at 09:09

## 2025-09-04 RX ADMIN — FENTANYL CITRATE 50 MCG: 50 INJECTION, SOLUTION INTRAMUSCULAR; INTRAVENOUS at 10:09

## 2025-09-04 RX ADMIN — CEFAZOLIN 2 G: 2 INJECTION, POWDER, FOR SOLUTION INTRAMUSCULAR; INTRAVENOUS at 09:09

## 2025-09-04 RX ADMIN — ROPIVACAINE HYDROCHLORIDE 30 ML: 5 INJECTION, SOLUTION EPIDURAL; INFILTRATION; PERINEURAL at 09:09

## 2025-09-04 RX ADMIN — PROPOFOL 50 MG: 10 INJECTION, EMULSION INTRAVENOUS at 09:09

## 2025-09-04 RX ADMIN — SODIUM CHLORIDE, SODIUM GLUCONATE, SODIUM ACETATE, POTASSIUM CHLORIDE AND MAGNESIUM CHLORIDE: 526; 502; 368; 37; 30 INJECTION, SOLUTION INTRAVENOUS at 09:09

## 2025-09-04 RX ADMIN — LIDOCAINE HYDROCHLORIDE 50 MG: 10 INJECTION, SOLUTION EPIDURAL; INFILTRATION; INTRACAUDAL; PERINEURAL at 09:09

## 2025-09-04 RX ADMIN — ONDANSETRON HYDROCHLORIDE 4 MG: 2 SOLUTION INTRAMUSCULAR; INTRAVENOUS at 10:09

## 2025-09-04 RX ADMIN — PHENYLEPHRINE HYDROCHLORIDE 50 MCG: 10 INJECTION INTRAVENOUS at 10:09

## 2025-09-04 RX ADMIN — PROPOFOL 80 MCG/KG/MIN: 10 INJECTION, EMULSION INTRAVENOUS at 09:09
